# Patient Record
Sex: MALE | Race: AMERICAN INDIAN OR ALASKA NATIVE
[De-identification: names, ages, dates, MRNs, and addresses within clinical notes are randomized per-mention and may not be internally consistent; named-entity substitution may affect disease eponyms.]

---

## 2019-07-25 ENCOUNTER — HOSPITAL ENCOUNTER (INPATIENT)
Dept: HOSPITAL 5 - ED | Age: 54
LOS: 4 days | Discharge: LEFT BEFORE DISCHARGE | DRG: 394 | End: 2019-07-29
Attending: HOSPITALIST | Admitting: INTERNAL MEDICINE
Payer: COMMERCIAL

## 2019-07-25 DIAGNOSIS — I11.0: ICD-10-CM

## 2019-07-25 DIAGNOSIS — Z99.81: ICD-10-CM

## 2019-07-25 DIAGNOSIS — E66.01: ICD-10-CM

## 2019-07-25 DIAGNOSIS — E78.5: ICD-10-CM

## 2019-07-25 DIAGNOSIS — I50.9: ICD-10-CM

## 2019-07-25 DIAGNOSIS — Z90.49: ICD-10-CM

## 2019-07-25 DIAGNOSIS — Z53.21: ICD-10-CM

## 2019-07-25 DIAGNOSIS — K43.6: Primary | ICD-10-CM

## 2019-07-25 LAB
ALBUMIN SERPL-MCNC: 4.4 G/DL (ref 3.9–5)
ALT SERPL-CCNC: 14 UNITS/L (ref 7–56)
APTT BLD: 26.5 SEC. (ref 24.2–36.6)
BASOPHILS # (AUTO): 0.1 K/MM3 (ref 0–0.1)
BASOPHILS NFR BLD AUTO: 0.9 % (ref 0–1.8)
BILIRUB UR QL STRIP: (no result)
BLOOD UR QL VISUAL: (no result)
BUN SERPL-MCNC: 9 MG/DL (ref 9–20)
BUN/CREAT SERPL: 8 %
CALCIUM SERPL-MCNC: 9.5 MG/DL (ref 8.4–10.2)
EOSINOPHIL # BLD AUTO: 0.2 K/MM3 (ref 0–0.4)
EOSINOPHIL NFR BLD AUTO: 1.6 % (ref 0–4.3)
HCT VFR BLD CALC: 41 % (ref 35.5–45.6)
HEMOLYSIS INDEX: 7
HGB BLD-MCNC: 14 GM/DL (ref 11.8–15.2)
INR PPP: 0.99 (ref 0.87–1.13)
LYMPHOCYTES # BLD AUTO: 2.8 K/MM3 (ref 1.2–5.4)
LYMPHOCYTES NFR BLD AUTO: 24.6 % (ref 13.4–35)
MCHC RBC AUTO-ENTMCNC: 34 % (ref 32–34)
MCV RBC AUTO: 85 FL (ref 84–94)
MONOCYTES # (AUTO): 0.8 K/MM3 (ref 0–0.8)
MONOCYTES % (AUTO): 7.2 % (ref 0–7.3)
MUCOUS THREADS #/AREA URNS HPF: (no result) /HPF
PH UR STRIP: 5 [PH] (ref 5–7)
PLATELET # BLD: 417 K/MM3 (ref 140–440)
RBC # BLD AUTO: 4.83 M/MM3 (ref 3.65–5.03)
RBC #/AREA URNS HPF: 3 /HPF (ref 0–6)
UROBILINOGEN UR-MCNC: < 2 MG/DL (ref ?–2)
WBC #/AREA URNS HPF: 1 /HPF (ref 0–6)

## 2019-07-25 PROCEDURE — 96361 HYDRATE IV INFUSION ADD-ON: CPT

## 2019-07-25 PROCEDURE — 81001 URINALYSIS AUTO W/SCOPE: CPT

## 2019-07-25 PROCEDURE — 87116 MYCOBACTERIA CULTURE: CPT

## 2019-07-25 PROCEDURE — 74022 RADEX COMPL AQT ABD SERIES: CPT

## 2019-07-25 PROCEDURE — 80053 COMPREHEN METABOLIC PANEL: CPT

## 2019-07-25 PROCEDURE — 0D9670Z DRAINAGE OF STOMACH WITH DRAINAGE DEVICE, VIA NATURAL OR ARTIFICIAL OPENING: ICD-10-PCS | Performed by: EMERGENCY MEDICINE

## 2019-07-25 PROCEDURE — 84484 ASSAY OF TROPONIN QUANT: CPT

## 2019-07-25 PROCEDURE — 71046 X-RAY EXAM CHEST 2 VIEWS: CPT

## 2019-07-25 PROCEDURE — 83690 ASSAY OF LIPASE: CPT

## 2019-07-25 PROCEDURE — 94660 CPAP INITIATION&MGMT: CPT

## 2019-07-25 PROCEDURE — 82140 ASSAY OF AMMONIA: CPT

## 2019-07-25 PROCEDURE — 85025 COMPLETE CBC W/AUTO DIFF WBC: CPT

## 2019-07-25 PROCEDURE — 93010 ELECTROCARDIOGRAM REPORT: CPT

## 2019-07-25 PROCEDURE — 74177 CT ABD & PELVIS W/CONTRAST: CPT

## 2019-07-25 PROCEDURE — 96376 TX/PRO/DX INJ SAME DRUG ADON: CPT

## 2019-07-25 PROCEDURE — 93005 ELECTROCARDIOGRAM TRACING: CPT

## 2019-07-25 PROCEDURE — 96374 THER/PROPH/DIAG INJ IV PUSH: CPT

## 2019-07-25 PROCEDURE — 85730 THROMBOPLASTIN TIME PARTIAL: CPT

## 2019-07-25 PROCEDURE — 36415 COLL VENOUS BLD VENIPUNCTURE: CPT

## 2019-07-25 PROCEDURE — 96375 TX/PRO/DX INJ NEW DRUG ADDON: CPT

## 2019-07-25 PROCEDURE — 85610 PROTHROMBIN TIME: CPT

## 2019-07-25 RX ADMIN — DEXTROSE AND SODIUM CHLORIDE SCH MLS/HR: 5; .45 INJECTION, SOLUTION INTRAVENOUS at 14:40

## 2019-07-25 RX ADMIN — FAMOTIDINE SCH MG: 10 INJECTION, SOLUTION INTRAVENOUS at 22:33

## 2019-07-25 RX ADMIN — FAMOTIDINE SCH MG: 10 INJECTION, SOLUTION INTRAVENOUS at 14:41

## 2019-07-25 RX ADMIN — Medication SCH ML: at 22:33

## 2019-07-25 RX ADMIN — MORPHINE SULFATE PRN MG: 2 INJECTION, SOLUTION INTRAMUSCULAR; INTRAVENOUS at 15:03

## 2019-07-25 NOTE — HISTORY AND PHYSICAL REPORT
History of Present Illness


Date of examination: 07/25/19


Date of admission: 


07/25/19 09:05





Chief complaint: 





n/v


History of present illness: 





A 54-year-old male with past medical history of hypertension, CHF, obesity, 

ventral hernia, status post cholecystectomy in 2018 and hernia repair in 2017 

who presents to emergency department with chief complaint of nausea, vomiting 

and abdominal pain.  The patient reports that his nausea vomiting started this 

morning.  He also reported subjective fever.  Abdominal pain is diffuse and 8/10

in intensity.  He denies any chest pain or shortness of breath.  No headache or 

visual disturbances.  No hematemesis.





Past History


Past Medical History: heart failure, hypertension, other (obesity)


Past Surgical History: cholecystectomy, hernia repair


Social history: no significant social history


Family history: no significant family history





Medications and Allergies


                                    Allergies











Allergy/AdvReac Type Severity Reaction Status Date / Time


 


No Known Allergies Allergy   Verified 07/25/19 06:55











                                Home Medications











 Medication  Instructions  Recorded  Confirmed  Last Taken  Type


 


Unobtainable  07/25/19 07/25/19 Unknown History














Review of Systems


All systems: negative





Exam





- Constitutional


Vitals: 


                                        











Temp Pulse Resp BP Pulse Ox


 


 97.6 F   76   16   141/91   97 


 


 07/25/19 01:53  07/25/19 08:48  07/25/19 08:48  07/25/19 08:48  07/25/19 08:48











General appearance: Present: no acute distress, obese





- EENT


Eyes: Present: PERRL


ENT: hearing intact, clear oral mucosa





- Neck


Neck: Present: supple, normal ROM





- Respiratory


Respiratory effort: normal


Respiratory: bilateral: CTA





- Cardiovascular


Heart Sounds: Present: S1 & S2.  Absent: rub, click





- Extremities


Extremities: pulses symmetrical, No edema


Peripheral Pulses: within normal limits





- Abdominal


General gastrointestinal: Present: soft, non-tender, non-distended, normal bowel

sounds


Localized gastrointestinal: tender: diffuse (mild)


Male genitourinary: Present: normal





- Integumentary


Integumentary: Present: clear, warm, dry





- Musculoskeletal


Musculoskeletal: gait normal, strength equal bilaterally





- Psychiatric


Psychiatric: appropriate mood/affect, intact judgment & insight





- Neurologic


Neurologic: CNII-XII intact, moves all extremities





Results





- Labs


CBC & Chem 7: 


                                 07/25/19 02:03





                                 07/25/19 02:00


Labs: 


                             Laboratory Last Values











WBC  11.4 K/mm3 (4.5-11.0)  H  07/25/19  02:03    


 


RBC  4.83 M/mm3 (3.65-5.03)   07/25/19  02:03    


 


Hgb  14.0 gm/dl (11.8-15.2)   07/25/19  02:03    


 


Hct  41.0 % (35.5-45.6)   07/25/19  02:03    


 


MCV  85 fl (84-94)   07/25/19  02:03    


 


MCH  29 pg (28-32)   07/25/19  02:03    


 


MCHC  34 % (32-34)   07/25/19  02:03    


 


RDW  14.8 % (13.2-15.2)   07/25/19  02:03    


 


Plt Count  417 K/mm3 (140-440)   07/25/19  02:03    


 


Lymph % (Auto)  24.6 % (13.4-35.0)   07/25/19  02:03    


 


Mono % (Auto)  7.2 % (0.0-7.3)   07/25/19  02:03    


 


Eos % (Auto)  1.6 % (0.0-4.3)   07/25/19  02:03    


 


Baso % (Auto)  0.9 % (0.0-1.8)   07/25/19  02:03    


 


Lymph #  2.8 K/mm3 (1.2-5.4)   07/25/19  02:03    


 


Mono #  0.8 K/mm3 (0.0-0.8)   07/25/19  02:03    


 


Eos #  0.2 K/mm3 (0.0-0.4)   07/25/19  02:03    


 


Baso #  0.1 K/mm3 (0.0-0.1)   07/25/19  02:03    


 


Seg Neutrophils %  65.7 % (40.0-70.0)   07/25/19  02:03    


 


Seg Neutrophils #  7.5 K/mm3 (1.8-7.7)   07/25/19  02:03    


 


PT  12.8 Sec. (12.2-14.9)   07/25/19  03:20    


 


INR  0.99  (0.87-1.13)   07/25/19  03:20    


 


APTT  26.5 Sec. (24.2-36.6)   07/25/19  03:20    


 


Sodium  140 mmol/L (137-145)   07/25/19  02:00    


 


Potassium  3.7 mmol/L (3.6-5.0)   07/25/19  02:00    


 


Chloride  99.3 mmol/L ()   07/25/19  02:00    


 


Carbon Dioxide  29 mmol/L (22-30)   07/25/19  02:00    


 


  15 mmol/L  07/25/19  02:00    


 


BUN  9 mg/dL (9-20)   07/25/19  02:00    


 


  1.1 mg/dL (0.8-1.5)   07/25/19  02:00    


 


Estimated GFR  > 60 ml/min  07/25/19  02:00    


 


  8 %  07/25/19  02:00    


 


Glucose  136 mg/dL ()  H  07/25/19  02:00    


 


Lactic Acid  1.60 mmol/L (0.7-2.0)   07/25/19  03:20    


 


Calcium  9.5 mg/dL (8.4-10.2)   07/25/19  02:00    


 


  0.20 mg/dL (0.1-1.2)   07/25/19  02:00    


 


AST  18 units/L (5-40)   07/25/19  02:00    


 


ALT  14 units/L (7-56)   07/25/19  02:00    


 


  78 units/L ()   07/25/19  02:00    


 


  < 0.010 ng/mL (0.00-0.029)   07/25/19  03:20    


 


  8.4 g/dL (6.3-8.2)  H  07/25/19  02:00    


 


  4.4 g/dL (3.9-5)   07/25/19  02:00    


 


  1.1 %  07/25/19  02:00    


 


  40 units/L (13-60)   07/25/19  03:20    


 


  Yellow  (Yellow)   07/25/19  04:32    


 


  Clear  (Clear)   07/25/19  04:32    


 


  5.0  (5.0-7.0)   07/25/19  04:32    


 


Ur Specific Gravity  1.018  (1.003-1.030)   07/25/19  04:32    


 


  30 mg/dl mg/dL (Negative)   07/25/19  04:32    


 


  Neg mg/dL (Negative)   07/25/19  04:32    


 


  Neg mg/dL (Negative)   07/25/19  04:32    


 


  Mod  (Negative)   07/25/19  04:32    


 


  Neg  (Negative)   07/25/19  04:32    


 


  Neg  (Negative)   07/25/19  04:32    


 


  < 2.0 mg/dL (<2.0)   07/25/19  04:32    


 


Ur Leukocyte Esterase  Neg  (Negative)   07/25/19  04:32    


 


  1.0 /HPF (0.0-6.0)   07/25/19  04:32    


 


  3.0 /HPF (0.0-6.0)   07/25/19  04:32    


 


U Epithel Cells (Auto)  < 1.0 /HPF (0-13.0)   07/25/19  04:32    


 


  Few /HPF  07/25/19  04:32    














Assessment and Plan


Assessment and plan: 





Small bowel obstruction.  CT scan revealed small bowel obstruction with a 

transition point seen near the surgical anastomosis located along the right 

lower quadrant.  Surgery consultation pending.  Conservative management for now 

with NPO status, NG tube to intermittent suction and IV fluid hydration





Ventral hernia.  Per surgery.





Hypertension.  IV hydralazine when necessary while nothing by mouth.





Hyperlipidemia.





Morbid obesity.

## 2019-07-25 NOTE — CAT SCAN REPORT
CT ABDOMEN AND PELVIS WITH CONTRAST



HISTORY: Right mid abdominal pain. Hernia.



COMPARISON: No relevant prior imaging study available.



TECHNIQUE: Axial, coronal and sagittal CT imaging of the abdomen and pelvis was performed  after inje
ction of 100 cc Omnipaque 350 contrast.  All CT scans at this location are performed using CT dose re
duction for ALARA by means of automated exposure control.

 

FINDINGS: 



LOWER CHEST: Mild dependent atelectasis is noted bilaterally. No additional significant abnormality.

LIVER: No significant abnormality.

BILIARY: The gallbladder is absent. No biliary ductal dilatation.

PANCREAS: No significant abnormality.

SPLEEN: No significant abnormality.

ADRENALS: No significant abnormality.

KIDNEYS AND URETERS: Bilateral renal cysts are seen without suspicious lesions. No stones or hydroure
teronephrosis.



GI TRACT: There are multiple ventral hernias containing fat and mildly dilated bowel loops without si
gnificant inflammation. A transition point is seen near the surgical anastomosis located along the ri
ght lower quadrant on images 58-71 of series 301. No significant abnormality of the stomach or colon.
 Unremarkable appendix. 

PERITONEUM: No free fluid. No free air. No fluid collection.

LYMPH NODES: No significant adenopathy.

VASCULATURE: The aorta is normal in caliber with mild generalized atherosclerosis. 



URINARY BLADDER: No significant abnormality.

REPRODUCTIVE ORGANS: No significant abnormality.



ADDITIONAL FINDINGS: None.



SKELETAL SYSTEM: No significant abnormality.



IMPRESSION: 

1. Small bowel obstruction with a transition point as described above.

2. Multiple ventral hernias containing dilated bowel loops.



Signer Name: Tremayne Rashid MD 

Signed: 7/25/2019 7:24 AM

 Workstation Name: [email or web address] [email or web address]

## 2019-07-25 NOTE — PROGRESS NOTE
Assessment and Plan





Full consult dictated:





Morbidly obese male.   sbo.  multiple abd surgeries with multiple ventral / 

incisional hernias.   no evidence of incarceration;  clinically or on CT





imp





morbid obesity


multitude of non incarcerated ventral hernias


sbo





rec


NPO


NG suction





f/u abd series in am.





may need bariatric surg eval in sbo does not resolve





will follow





History of present illness: 





A 54-year-old male with past medical history of hypertension, CHF, obesity, 

ventral hernia, status post cholecystectomy in 2018 and hernia repair in 2017 

who presents to emergency department with chief complaint of nausea, vomiting 

and abdominal pain.  The patient reports that his nausea vomiting started this 

morning.  He also reported subjective fever.  Abdominal pain is diffuse and 8/10

in intensity.  He denies any chest pain or shortness of breath.  No headache or 

visual disturbances.  No hematemesis.





 Past History 


Past Medical History: heart failure, hypertension, other (obesity)


Past Surgical History: cholecystectomy, hernia repair


Social history: no significant social history


Family history: no significant family history








                                Selected Entries











  07/25/19 07/25/19





  01:53 08:48


 


Temperature 97.6 F 


 


Pulse Rate  76


 


Respiratory  16





Rate  


 


Blood Pressure  141/91





[Left]  








                                Laboratory Tests











  07/25/19 07/25/19





  02:00 02:03


 


WBC   11.4 H


 


Hgb   14.0


 


Hct   41.0


 


Potassium  3.7 














Objective


                               Vital Signs - 12hr











  07/25/19 07/25/19





  01:53 08:48


 


Temperature 97.6 F 


 


Pulse Rate 76 76


 


Respiratory 19 16





Rate  


 


Blood Pressure 193/104 


 


Blood Pressure  141/91





[Left]  


 


O2 Sat by Pulse 96 97





Oximetry  














- Labs





                                 07/25/19 02:03





                                 07/25/19 02:00


                                 Diabetes panel











  07/25/19 Range/Units





  02:00 


 


Sodium  140  (137-145)  mmol/L


 


Potassium  3.7  (3.6-5.0)  mmol/L


 


Chloride  99.3  ()  mmol/L


 


Carbon Dioxide  29  (22-30)  mmol/L


 


BUN  9  (9-20)  mg/dL


 


Creatinine  1.1  (0.8-1.5)  mg/dL


 


Glucose  136 H  ()  mg/dL


 


Calcium  9.5  (8.4-10.2)  mg/dL


 


AST  18  (5-40)  units/L


 


ALT  14  (7-56)  units/L


 


Alkaline Phosphatase  78  ()  units/L


 


Total Protein  8.4 H  (6.3-8.2)  g/dL


 


Albumin  4.4  (3.9-5)  g/dL








                                  Calcium panel











  07/25/19 Range/Units





  02:00 


 


Calcium  9.5  (8.4-10.2)  mg/dL


 


Albumin  4.4  (3.9-5)  g/dL








                                 Pituitary panel











  07/25/19 Range/Units





  02:00 


 


Sodium  140  (137-145)  mmol/L


 


Potassium  3.7  (3.6-5.0)  mmol/L


 


Chloride  99.3  ()  mmol/L


 


Carbon Dioxide  29  (22-30)  mmol/L


 


BUN  9  (9-20)  mg/dL


 


Creatinine  1.1  (0.8-1.5)  mg/dL


 


Glucose  136 H  ()  mg/dL


 


Calcium  9.5  (8.4-10.2)  mg/dL








                                  Adrenal panel











  07/25/19 Range/Units





  02:00 


 


Sodium  140  (137-145)  mmol/L


 


Potassium  3.7  (3.6-5.0)  mmol/L


 


Chloride  99.3  ()  mmol/L


 


Carbon Dioxide  29  (22-30)  mmol/L


 


BUN  9  (9-20)  mg/dL


 


Creatinine  1.1  (0.8-1.5)  mg/dL


 


Glucose  136 H  ()  mg/dL


 


Calcium  9.5  (8.4-10.2)  mg/dL


 


Total Bilirubin  0.20  (0.1-1.2)  mg/dL


 


AST  18  (5-40)  units/L


 


ALT  14  (7-56)  units/L


 


Alkaline Phosphatase  78  ()  units/L


 


Total Protein  8.4 H  (6.3-8.2)  g/dL


 


Albumin  4.4  (3.9-5)  g/dL

## 2019-07-25 NOTE — EMERGENCY DEPARTMENT REPORT
<YOSI GONZALEZ - Last Filed: 19 05:58>





ED Abdominal Pain HPI





- General


Chief Complaint: Abdominal Pain


Stated Complaint: ABDOMINAL PAIN


Time Seen by Provider: 19 03:08


Source: patient


Mode of arrival: Ambulatory


Limitations: No Limitations





- History of Present Illness


Initial Comments: 


pt is a 55 y/o aam with hx of htn, chf, Obesity, Ventral Hernia,  s/p 

cholecystectomy 2018,  last hernia repair , who presents for abd pain and 

n/v since this am,  subjective fever, pain is 8/10 , last po intake this am, pt 

states he usually goes to [facility] for healthcare , but pain too bad to wait 

tonight.   





MD Complaint: abdominal pain


Onset/Timin


-: days(s)


Location: periumbilical


Radiation: LLQ, RLQ


Migration to: periumbilical


Severity: moderate


Severity scale (0 -10): 7


Quality: aching, sharp


Consistency: constant


Improves With: nothing


Worsens With: eating


Associated Symptoms: nausea, vomiting, fever, constipation





- Related Data


                                    Allergies











Allergy/AdvReac Type Severity Reaction Status Date / Time


 


No Known Allergies Allergy   Verified 19 06:55














ED Review of Systems


Constitutional: fever.  denies: chills


Eyes: denies: eye pain, eye discharge, vision change


ENT: denies: ear pain, throat pain


Respiratory: denies: cough, shortness of breath, wheezing


Cardiovascular: edema (bila le ).  denies: chest pain, palpitations


Endocrine: no symptoms reported


Gastrointestinal: abdominal pain, nausea, constipation


Genitourinary: denies: urgency, dysuria


Musculoskeletal: denies: back pain, joint swelling, arthralgia


Skin: denies: rash, lesions


Neurological: denies: headache, weakness, paresthesias


Psychiatric: denies: anxiety, depression


Hematological/Lymphatic: as per HPI





ED Past Medical Hx





- Past Medical History


Previous Medical History?: Yes


Hx Hypertension: Yes


Hx Congestive Heart Failure: Yes





- Surgical History


Past Surgical History?: Yes


Hx Cholecystectomy: Yes


Additional Surgical History: hernia sx





- Social History


Smoking Status: Never Smoker





ED Physical Exam





- General


Limitations: No Limitations


General appearance: alert, in no apparent distress





- Head


Head exam: Present: atraumatic, normocephalic





- Eye


Eye exam: Present: normal appearance, PERRL, EOMI


Pupils: Present: normal accommodation





- ENT


ENT exam: Present: normal orophraynx, mucous membranes moist, TM's normal 

bilaterally, normal external ear exam





- Neck


Neck exam: Present: normal inspection, full ROM.  Absent: tenderness, 

meningismus, lymphadenopathy, thyromegaly





- Expanded Neck Exam


  ** Expanded


Neck exam: Absent: tenderness, midline deformity, anterior neck swelling, 

thyroid mass, carotid bruit, tracheal deviation





- Respiratory


Respiratory exam: Present: normal lung sounds bilaterally.  Absent: respiratory 

distress, wheezes, stridor, chest wall tenderness, prolonged expiratory





- Cardiovascular


Cardiovascular Exam: Present: regular rate, normal rhythm, normal heart sounds





- GI/Abdominal


GI/Abdominal exam: Present: soft, distended, tenderness, guarding, rebound, 

diminished bowel sounds, hernia.  Absent: rigid, bruit





- Rectal


Rectal exam: Present: deferred





- Extremities Exam


Extremities exam: Present: normal inspection, full ROM, normal capillary refill,

pedal edema (mild bilat LE Edema nonpitting).  Absent: tenderness, joint swel

ling, calf tenderness





- Back Exam


Back exam: Present: normal inspection, full ROM.  Absent: tenderness, CVA 

tenderness (R), CVA tenderness (L), muscle spasm, paraspinal tenderness, 

vertebral tenderness, rash noted





- Neurological Exam


Neurological exam: Present: alert, oriented X3, CN II-XII intact, normal gait, 

reflexes normal.  Absent: motor sensory deficit





- Psychiatric


Psychiatric exam: Present: normal affect, normal mood





- Skin


Skin exam: Present: warm, dry, intact, normal color.  Absent: rash





ED Medical Decision Making





- Lab Data


Result diagrams: 


                                 19 02:03





                                 19 02:00





- Radiology Data


Radiology results: report reviewed, image reviewed








Ordering Physician: YOSI GONZALEZ NP 


Date of Service: 19 


Procedure(s): XR chest routine 2V 


Accession Number(s): [id number] 





cc: YOSI GONZALEZ NP 





Fluoro Time In Minutes: 





CHEST 2 VIEWS 





INDICATION / CLINICAL INFORMATION: 


Shortness of breath for 3 days. 





COMPARISON: 


None available. 





FINDINGS: 





SUPPORT DEVICES: None. 


HEART / MEDIASTINUM: No significant abnormality. 


LUNGS / PLEURA: There is mild bibasilar atelectasis. The lungs are otherwise 

clear. No significant 


pleural effusion. No pneumothorax. 





ADDITIONAL FINDINGS: No significant additional findings. 





IMPRESSION: 


Bibasilar atelectasis. 





Signer Name: Tremayne Rashid MD 


Signed: 2019 4:02 AM 


Workstation Name: [id number] 








Transcribed By: MN 


Dictated By: Tremayne Rashid MD 


Electronically Authenticated By: Tremayne Rashid MD 


Signed Date/Time: 19 











DD/DT: 19 


TD/TT:








ED Disposition


Clinical Impression: 


 SBO (small bowel obstruction), Ventral hernia





Disposition:  OP ADMIT IP TO THIS HOSP


Condition: Stable


Referrals: 


[facility], [place] [Primary Care Provider] - 3-5 Days





<JESSICA MARTINEZ - Last Filed: 19 08:47>





ED Course





- Reevaluation(s)


Reevaluation #1: 





19 07:00 sign out from Yosi Gonzalez NP to Jessica Martinez PA-C pending CT

abd/pelvis


7:30 AM: CT ab/pelvis shows SBO with multiple ventral hernias with dilated loops

of bowel present in the hernia, pt made NPO, NGT ordered, case discussed with 

Dr. Pappas, Dr. Pappas took over pt and pt moved to the Main ED    








ED Medical Decision Making





- Lab Data


Result diagrams: 


                                 19 02:03





                                 19 02:00





<NORI PAPPAS - Last Filed: 19 09:23>





ED Review of Systems


ROS: 


Stated complaint: ABDOMINAL PAIN


Other details as noted in HPI








ED Course


                                   Vital Signs











  19





  01:53 08:48


 


Temperature 97.6 F 


 


Pulse Rate 76 76


 


Respiratory 19 16





Rate  


 


Blood Pressure 193/104 


 


Blood Pressure  141/91





[Left]  


 


O2 Sat by Pulse 96 97





Oximetry  














- Reevaluation(s)


Reevaluation #2: 





19 09:23


Patient received Dilaudid 1 mg since the NG tube.  Patient placed in supine 

position I attempted to reduce right sided ventral hernia lateral to the midline

 incisional scar.  I was able to partially reduce but unable to completely 

reduce this hernia.





- Consultations


Consultation #1: 





19 08:09


case d/w Dr Silva, will consult





ED Medical Decision Making





- Lab Data


Result diagrams: 


                                 19 02:03





                                 19 02:00





- Medical Decision Making





additional pain meds ordered


ngt


surgery consult


hospitalist informed





Critical Care Time: No


Critical care attestation.: 


If time is entered above; I have spent that time in minutes in the direct care 

of this critically ill patient, excluding procedure time.








ED Disposition


Is pt being admited?: Yes


Time of Disposition: 08:09 (Dr wolff/hosp)

## 2019-07-25 NOTE — XRAY REPORT
ABDOMINAL SERIES WITH PA CHEST

7/25/2019



INDICATION / CLINICAL INFORMATION:

sbo.



COMPARISON:

7/25/2019 abdominal/pelvic CT scan



FINDINGS:

The nasogastric tube is called mid stomach.

There is persistent gaseous distention of several loops of small bowel localized to the upper abdomen
.

The colon remains nondistended.

Urinary bladder is filled with contrast from recent abdominal CT scan.



The accompanying chest x-ray shows mild atelectatic changes at both lung bases but no areas of airspa
ce consolidation.





IMPRESSION:

No significant change in gaseous distention of small bowel.



Signer Name: Horace Cartagena MD 

Signed: 7/25/2019 2:31 PM

 Workstation Name: [id number]

## 2019-07-25 NOTE — XRAY REPORT
CHEST 2 VIEWS 



INDICATION / CLINICAL INFORMATION:

Shortness of breath for 3 days.



COMPARISON: 

None available.



FINDINGS:



SUPPORT DEVICES: None.

HEART / MEDIASTINUM: No significant abnormality. 

LUNGS / PLEURA: There is mild bibasilar atelectasis. The lungs are otherwise clear. No significant pl
eural effusion. No pneumothorax. 



ADDITIONAL FINDINGS: No significant additional findings.



IMPRESSION:

Bibasilar atelectasis.



Signer Name: Tremayne Rashid MD 

Signed: 7/25/2019 4:02 AM

 Workstation Name: [id number]

## 2019-07-26 LAB
ALBUMIN SERPL-MCNC: 4 G/DL (ref 3.9–5)
ALT SERPL-CCNC: 19 UNITS/L (ref 7–56)
BASOPHILS # (AUTO): 0 K/MM3 (ref 0–0.1)
BASOPHILS NFR BLD AUTO: 0.4 % (ref 0–1.8)
BUN SERPL-MCNC: 11 MG/DL (ref 9–20)
BUN/CREAT SERPL: 11 %
CALCIUM SERPL-MCNC: 9.1 MG/DL (ref 8.4–10.2)
EOSINOPHIL # BLD AUTO: 0.2 K/MM3 (ref 0–0.4)
EOSINOPHIL NFR BLD AUTO: 2.5 % (ref 0–4.3)
HCT VFR BLD CALC: 40.6 % (ref 35.5–45.6)
HEMOLYSIS INDEX: 7
HGB BLD-MCNC: 13.5 GM/DL (ref 11.8–15.2)
LYMPHOCYTES # BLD AUTO: 3 K/MM3 (ref 1.2–5.4)
LYMPHOCYTES NFR BLD AUTO: 30.4 % (ref 13.4–35)
MCHC RBC AUTO-ENTMCNC: 33 % (ref 32–34)
MCV RBC AUTO: 86 FL (ref 84–94)
MONOCYTES # (AUTO): 0.9 K/MM3 (ref 0–0.8)
MONOCYTES % (AUTO): 9 % (ref 0–7.3)
PLATELET # BLD: 354 K/MM3 (ref 140–440)
RBC # BLD AUTO: 4.74 M/MM3 (ref 3.65–5.03)

## 2019-07-26 RX ADMIN — DEXTROSE AND SODIUM CHLORIDE SCH MLS/HR: 5; .45 INJECTION, SOLUTION INTRAVENOUS at 11:25

## 2019-07-26 RX ADMIN — MORPHINE SULFATE PRN MG: 2 INJECTION, SOLUTION INTRAMUSCULAR; INTRAVENOUS at 12:25

## 2019-07-26 RX ADMIN — DEXTROSE AND SODIUM CHLORIDE SCH MLS/HR: 5; .45 INJECTION, SOLUTION INTRAVENOUS at 23:35

## 2019-07-26 RX ADMIN — ACETAMINOPHEN PRN MG: 325 TABLET ORAL at 21:07

## 2019-07-26 RX ADMIN — Medication SCH ML: at 11:25

## 2019-07-26 RX ADMIN — MINERAL OIL SCH ML: 1000 SOLUTION ORAL at 15:49

## 2019-07-26 RX ADMIN — FAMOTIDINE SCH MG: 10 INJECTION, SOLUTION INTRAVENOUS at 22:59

## 2019-07-26 RX ADMIN — ACETAMINOPHEN PRN MG: 325 TABLET ORAL at 15:49

## 2019-07-26 RX ADMIN — FAMOTIDINE SCH MG: 10 INJECTION, SOLUTION INTRAVENOUS at 11:25

## 2019-07-26 RX ADMIN — MORPHINE SULFATE PRN MG: 2 INJECTION, SOLUTION INTRAMUSCULAR; INTRAVENOUS at 18:47

## 2019-07-26 RX ADMIN — MINERAL OIL SCH ML: 1000 SOLUTION ORAL at 22:52

## 2019-07-26 RX ADMIN — MINERAL OIL SCH ML: 1000 SOLUTION ORAL at 18:47

## 2019-07-26 RX ADMIN — Medication SCH ML: at 23:00

## 2019-07-26 NOTE — XRAY REPORT
ABDOMINAL SERIES WITH PA CHEST X-RAY

7/26/2019



INDICATION / CLINICAL INFORMATION:

sbo.



COMPARISON:

7/25/2019



FINDINGS:

Small bowel gaseous distention is slightly improved.

The colon gas pattern remains normal.



No evidence of peritoneum.

The accompanying chest x-ray shows no acute pulmonary disease.



Signer Name: Horace Cartagena MD 

Signed: 7/26/2019 9:33 AM

 Workstation Name: [id number]

## 2019-07-26 NOTE — PROGRESS NOTE
Assessment and Plan


Assessment and plan: 





Small bowel obstruction.  CT scan revealed small bowel obstruction with a 

transition point seen near the surgical anastomosis located along the right 

lower quadrant.  Surgery following.  Conservative management for now with NPO 

status, NG tube to intermittent suction and IV fluid hydration.  Patient bety liu pulled out NG tube and refused reinsertion.  Surgery has now convinced 

the patient to have it reinserted.





Ventral hernia.  Per surgery.





Hypertension.  IV hydralazine when necessary while nothing by mouth.





Hyperlipidemia.





Morbid obesity.





Probable OHS/KATELIN.  Continue O2





History


Interval history: 


Pulled out ng and had refused re-insertion.  states "feeling better"








Hospitalist Physical





- Constitutional


Vitals: 


                                        











Temp Pulse Resp BP Pulse Ox


 


 98.0 F   79   20   148/91   94 


 


 07/26/19 05:01  07/26/19 05:01  07/26/19 05:01  07/26/19 05:01  07/26/19 05:01











General appearance: Present: no acute distress, obese





- EENT


Eyes: Present: PERRL, EOM intact


ENT: hearing intact, clear oral mucosa, dentition normal





- Neck


Neck: Present: supple, normal ROM





- Respiratory


Respiratory effort: normal


Respiratory: bilateral: CTA





- Cardiovascular


Rhythm: regular


Heart Sounds: Present: S1 & S2.  Absent: gallop, rub





- Extremities


Extremities: no ischemia, No edema, Full ROM





- Abdominal


General gastrointestinal: soft, non-tender, non-distended, normal bowel sounds





- Integumentary


Integumentary: Present: clear, warm, dry





- Neurologic


Neurologic: CNII-XII intact, moves all extremities





Results





- Labs


CBC & Chem 7: 


                                 07/26/19 07:23





                                 07/26/19 07:23


Labs: 


                             Laboratory Last Values











WBC  9.8 K/mm3 (4.5-11.0)   07/26/19  07:23    


 


RBC  4.74 M/mm3 (3.65-5.03)   07/26/19  07:23    


 


Hgb  13.5 gm/dl (11.8-15.2)   07/26/19  07:23    


 


Hct  40.6 % (35.5-45.6)   07/26/19  07:23    


 


MCV  86 fl (84-94)   07/26/19  07:23    


 


MCH  29 pg (28-32)   07/26/19  07:23    


 


MCHC  33 % (32-34)   07/26/19  07:23    


 


RDW  14.8 % (13.2-15.2)   07/26/19  07:23    


 


Plt Count  354 K/mm3 (140-440)   07/26/19  07:23    


 


Lymph % (Auto)  30.4 % (13.4-35.0)   07/26/19  07:23    


 


Mono % (Auto)  9.0 % (0.0-7.3)  H  07/26/19  07:23    


 


Eos % (Auto)  2.5 % (0.0-4.3)   07/26/19  07:23    


 


Baso % (Auto)  0.4 % (0.0-1.8)   07/26/19  07:23    


 


Lymph #  3.0 K/mm3 (1.2-5.4)   07/26/19  07:23    


 


Mono #  0.9 K/mm3 (0.0-0.8)  H  07/26/19  07:23    


 


Eos #  0.2 K/mm3 (0.0-0.4)   07/26/19  07:23    


 


Baso #  0.0 K/mm3 (0.0-0.1)   07/26/19  07:23    


 


Seg Neutrophils %  57.7 % (40.0-70.0)   07/26/19  07:23    


 


Seg Neutrophils #  5.7 K/mm3 (1.8-7.7)   07/26/19  07:23    


 


PT  12.8 Sec. (12.2-14.9)   07/25/19  03:20    


 


INR  0.99  (0.87-1.13)   07/25/19  03:20    


 


APTT  26.5 Sec. (24.2-36.6)   07/25/19  03:20    


 


Sodium  143 mmol/L (137-145)   07/26/19  07:23    


 


Potassium  3.7 mmol/L (3.6-5.0)   07/26/19  07:23    


 


Chloride  103.3 mmol/L ()   07/26/19  07:23    


 


Carbon Dioxide  30 mmol/L (22-30)   07/26/19  07:23    


 


  13 mmol/L  07/26/19  07:23    


 


BUN  11 mg/dL (9-20)   07/26/19  07:23    


 


  1.0 mg/dL (0.8-1.5)   07/26/19  07:23    


 


Estimated GFR  > 60 ml/min  07/26/19  07:23    


 


  11 %  07/26/19  07:23    


 


Glucose  106 mg/dL ()  H  07/26/19  07:23    


 


Lactic Acid  1.60 mmol/L (0.7-2.0)   07/25/19  03:20    


 


Calcium  9.1 mg/dL (8.4-10.2)   07/26/19  07:23    


 


  0.60 mg/dL (0.1-1.2)   07/26/19  07:23    


 


AST  15 units/L (5-40)   07/26/19  07:23    


 


ALT  19 units/L (7-56)   07/26/19  07:23    


 


  74 units/L ()   07/26/19  07:23    


 


  < 0.010 ng/mL (0.00-0.029)   07/25/19  03:20    


 


  7.8 g/dL (6.3-8.2)   07/26/19  07:23    


 


  4.0 g/dL (3.9-5)   07/26/19  07:23    


 


  1.1 %  07/26/19  07:23    


 


  40 units/L (13-60)   07/25/19  03:20    


 


  Yellow  (Yellow)   07/25/19  04:32    


 


  Clear  (Clear)   07/25/19  04:32    


 


  5.0  (5.0-7.0)   07/25/19  04:32    


 


Ur Specific Gravity  1.018  (1.003-1.030)   07/25/19  04:32    


 


  30 mg/dl mg/dL (Negative)   07/25/19  04:32    


 


  Neg mg/dL (Negative)   07/25/19  04:32    


 


  Neg mg/dL (Negative)   07/25/19  04:32    


 


  Mod  (Negative)   07/25/19  04:32    


 


  Neg  (Negative)   07/25/19  04:32    


 


  Neg  (Negative)   07/25/19  04:32    


 


  < 2.0 mg/dL (<2.0)   07/25/19  04:32    


 


Ur Leukocyte Esterase  Neg  (Negative)   07/25/19  04:32    


 


  1.0 /HPF (0.0-6.0)   07/25/19  04:32    


 


  3.0 /HPF (0.0-6.0)   07/25/19  04:32    


 


U Epithel Cells (Auto)  < 1.0 /HPF (0-13.0)   07/25/19  04:32    


 


  Few /HPF  07/25/19  04:32    














Active Medications





- Current Medications


Current Medications: 














Generic Name Dose Route Start Last Admin





  Trade Name Freq  PRN Reason Stop Dose Admin


 


Acetaminophen  650 mg  07/25/19 10:07 





  Tylenol  PO  





  Q4H PRN  





  Pain MILD(1-3)/Fever >100.5/HA  


 


Famotidine  20 mg  07/25/19 13:00  07/25/19 22:33





  Pepcid  IV   20 mg





  BID LACEY   Administration


 


Hydralazine HCl  5 mg  07/25/19 23:49  07/26/19 00:02





  Apresoline  IV   5 mg





  Q6H PRN   Administration





  Hypertension  


 


Dextrose/Sodium Chloride  1,000 mls @ 75 mls/hr  07/25/19 11:00  07/25/19 14:40





  D5/0.45ns  IV   75 mls/hr





  AS DIRECT LACEY   Administration


 


Morphine Sulfate  2 mg  07/25/19 14:46  07/25/19 15:03





  Morphine  IV   2 mg





  Q6H PRN   Administration





  Pain, Moderate (4-6)  


 


Ondansetron HCl  4 mg  07/25/19 10:07 





  Zofran  IV  





  Q8H PRN  





  Nausea And Vomiting  


 


Sodium Chloride  10 ml  07/25/19 22:00  07/25/19 22:33





  Sodium Chloride Flush Syringe 10 Ml  IV   10 ml





  BID LACEY   Administration


 


Sodium Chloride  10 ml  07/25/19 10:07 





  Sodium Chloride Flush Syringe 10 Ml  IV  





  PRN PRN  





  LINE FLUSH

## 2019-07-26 NOTE — CONSULTATION
REASON FOR CONSULTATION:  Rule out small bowel obstruction.



HISTORY OF PRESENT ILLNESS:  The patient is a 54-year-old morbidly obese male

who presented to the Emergency Room with recent onset of abdominal pain.  Denied

any nausea or vomiting.  States his last bowel movement was yesterday.



PAST MEDICAL HISTORY:  Pertinent for morbid obesity as well as hypertension and

sleep apnea.



PAST SURGICAL HISTORY:  The patient has had a multitude of abdominal surgeries,

all done at [facility].  Apparently, the patient had a cholecystectomy, also

umbilical hernia repair.  Subsequent incisional hernia repair with some sort of

bowel resection ?



ALLERGIES:  No known allergies.



MEDICATIONS:  "



FAMILY HISTORY:  Negative.



SOCIAL HISTORY:  Quit smoking and drinking approximately 15 years ago.



PHYSICAL EXAMINATION:

GENERAL:  At this time revealed the patient to be morbidly obese.  NG tube is in

place and appears to be suctioning well.

VITAL SIGNS:  Show him to be afebrile with a temperature of 97.6, blood pressure

is 141/91, pulse of 76, respirations 16.

ABDOMEN:  Examination of the abdomen once again reveals it to be morbidly obese.

 There are multiple scars noted.  Also, multiple ventral/incisional hernias are

seen.  All of them appear to be reducible.  No abdominal pain is noted at this

time.



LABORATORY DATA:  Lab work at present includes a CBC, which shows a white count

of 11.4, H and H is 14 and 41.  Electrolytes are essentially within normal

limits, including a sodium of 140, potassium 3.7, chloride of 99, BUN is 9,

creatinine is 1.1.  LFTs are also within normal limits.  Glucose is 136.  CT

scan of the abdomen without any p.o. contrast was done, which I have reviewed

with the radiologist.  The findings are consistent with a small bowel

obstruction, but the areas of herniation did not appear to be incarcerated, as

dilated bowel loops were noted to come in and out of the hernia regions. 

Complex hernias are noted.



IMPRESSION:  At this time is that of a 54-year-old hypertensive, morbidly obese

male, rule out small-bowel obstruction.



RECOMMENDATIONS:  At this time is to keep the patient n.p.o. on IV fluid

hydration and NG suction.  I will repeat abdominal series in the morning.  Also,

contemplate obtaining a bariatric surgeon evaluation if the small-bowel

obstruction does not appear to be resolving, and thus, surgical intervention may

need to be contemplated.  I will follow with you.



Thank you very much for consultation.





DD: 07/25/2019 12:35

DT: 07/26/2019 00:34

JOB# [id number]  [id number]

EDDY/ANA PAULA

## 2019-07-27 RX ADMIN — MINERAL OIL SCH ML: 1000 SOLUTION ORAL at 10:08

## 2019-07-27 RX ADMIN — HYDRALAZINE HYDROCHLORIDE PRN MG: 20 INJECTION INTRAMUSCULAR; INTRAVENOUS at 23:42

## 2019-07-27 RX ADMIN — MINERAL OIL SCH: 1000 SOLUTION ORAL at 05:55

## 2019-07-27 RX ADMIN — MINERAL OIL SCH ML: 1000 SOLUTION ORAL at 17:17

## 2019-07-27 RX ADMIN — MINERAL OIL SCH: 1000 SOLUTION ORAL at 02:38

## 2019-07-27 RX ADMIN — Medication SCH ML: at 10:08

## 2019-07-27 RX ADMIN — FAMOTIDINE SCH MG: 10 INJECTION, SOLUTION INTRAVENOUS at 10:08

## 2019-07-27 RX ADMIN — MINERAL OIL SCH ML: 1000 SOLUTION ORAL at 13:11

## 2019-07-27 RX ADMIN — Medication SCH ML: at 21:35

## 2019-07-27 RX ADMIN — MINERAL OIL SCH ML: 1000 SOLUTION ORAL at 21:35

## 2019-07-27 RX ADMIN — DEXTROSE AND SODIUM CHLORIDE SCH MLS/HR: 5; .45 INJECTION, SOLUTION INTRAVENOUS at 15:20

## 2019-07-27 RX ADMIN — FAMOTIDINE SCH MG: 10 INJECTION, SOLUTION INTRAVENOUS at 21:34

## 2019-07-27 NOTE — PROGRESS NOTE
Assessment and Plan


Assessment and plan: 





Small bowel obstruction.  Surgery following.  Conservative management for now 

with NPO status, NG tube to intermittent suction and IV fluid hydration.  NG 

tube came out again yesterday.  Await surgical recommendations regarding diet 

versus reinsertion of NG tube.  KUB ordered





Ventral hernia.  Per surgery.





Hypertension.  IV hydralazine when necessary while nothing by mouth.





Hyperlipidemia.





Morbid obesity.





Probable OHS/KATELIN.  Continue O2





History


Interval history: 


Patient states that N G-tube "fell out".  He denies pulling it out.  He wants to

go home.








Hospitalist Physical





- Constitutional


Vitals: 


                                        











Temp Pulse Resp BP Pulse Ox


 


 97.9 F   85   18   138/70   95 


 


 07/27/19 06:10  07/27/19 06:45  07/27/19 06:45  07/27/19 06:42  07/27/19 06:45











General appearance: Present: no acute distress, obese





- EENT


Eyes: Present: PERRL, EOM intact


ENT: hearing intact, clear oral mucosa, dentition normal





- Neck


Neck: Present: supple, normal ROM





- Respiratory


Respiratory effort: normal


Respiratory: bilateral: CTA





- Cardiovascular


Rhythm: regular


Heart Sounds: Present: S1 & S2.  Absent: gallop, rub





- Extremities


Extremities: no ischemia, No edema, Full ROM





- Abdominal


General gastrointestinal: soft, non-tender, non-distended, normal bowel sounds





- Integumentary


Integumentary: Present: clear, warm, dry





- Neurologic


Neurologic: CNII-XII intact, moves all extremities





Results





- Labs


CBC & Chem 7: 


                                 07/26/19 07:23





                                 07/26/19 07:23


Labs: 


                             Laboratory Last Values











WBC  9.8 K/mm3 (4.5-11.0)   07/26/19  07:23    


 


RBC  4.74 M/mm3 (3.65-5.03)   07/26/19  07:23    


 


Hgb  13.5 gm/dl (11.8-15.2)   07/26/19  07:23    


 


Hct  40.6 % (35.5-45.6)   07/26/19  07:23    


 


MCV  86 fl (84-94)   07/26/19  07:23    


 


MCH  29 pg (28-32)   07/26/19  07:23    


 


MCHC  33 % (32-34)   07/26/19  07:23    


 


RDW  14.8 % (13.2-15.2)   07/26/19  07:23    


 


Plt Count  354 K/mm3 (140-440)   07/26/19  07:23    


 


Lymph % (Auto)  30.4 % (13.4-35.0)   07/26/19  07:23    


 


Mono % (Auto)  9.0 % (0.0-7.3)  H  07/26/19  07:23    


 


Eos % (Auto)  2.5 % (0.0-4.3)   07/26/19  07:23    


 


Baso % (Auto)  0.4 % (0.0-1.8)   07/26/19  07:23    


 


Lymph #  3.0 K/mm3 (1.2-5.4)   07/26/19  07:23    


 


Mono #  0.9 K/mm3 (0.0-0.8)  H  07/26/19  07:23    


 


Eos #  0.2 K/mm3 (0.0-0.4)   07/26/19  07:23    


 


Baso #  0.0 K/mm3 (0.0-0.1)   07/26/19  07:23    


 


Seg Neutrophils %  57.7 % (40.0-70.0)   07/26/19  07:23    


 


Seg Neutrophils #  5.7 K/mm3 (1.8-7.7)   07/26/19  07:23    


 


PT  12.8 Sec. (12.2-14.9)   07/25/19  03:20    


 


INR  0.99  (0.87-1.13)   07/25/19  03:20    


 


APTT  26.5 Sec. (24.2-36.6)   07/25/19  03:20    


 


Sodium  143 mmol/L (137-145)   07/26/19  07:23    


 


Potassium  3.7 mmol/L (3.6-5.0)   07/26/19  07:23    


 


Chloride  103.3 mmol/L ()   07/26/19  07:23    


 


Carbon Dioxide  30 mmol/L (22-30)   07/26/19  07:23    


 


  13 mmol/L  07/26/19  07:23    


 


BUN  11 mg/dL (9-20)   07/26/19  07:23    


 


  1.0 mg/dL (0.8-1.5)   07/26/19  07:23    


 


Estimated GFR  > 60 ml/min  07/26/19  07:23    


 


  11 %  07/26/19  07:23    


 


Glucose  106 mg/dL ()  H  07/26/19  07:23    


 


Lactic Acid  1.60 mmol/L (0.7-2.0)   07/25/19  03:20    


 


Calcium  9.1 mg/dL (8.4-10.2)   07/26/19  07:23    


 


  0.60 mg/dL (0.1-1.2)   07/26/19  07:23    


 


AST  15 units/L (5-40)   07/26/19  07:23    


 


ALT  19 units/L (7-56)   07/26/19  07:23    


 


  74 units/L ()   07/26/19  07:23    


 


  < 0.010 ng/mL (0.00-0.029)   07/25/19  03:20    


 


  7.8 g/dL (6.3-8.2)   07/26/19  07:23    


 


  4.0 g/dL (3.9-5)   07/26/19  07:23    


 


  1.1 %  07/26/19  07:23    


 


  40 units/L (13-60)   07/25/19  03:20    


 


  Yellow  (Yellow)   07/25/19  04:32    


 


  Clear  (Clear)   07/25/19  04:32    


 


  5.0  (5.0-7.0)   07/25/19  04:32    


 


Ur Specific Gravity  1.018  (1.003-1.030)   07/25/19  04:32    


 


  30 mg/dl mg/dL (Negative)   07/25/19  04:32    


 


  Neg mg/dL (Negative)   07/25/19  04:32    


 


  Neg mg/dL (Negative)   07/25/19  04:32    


 


  Mod  (Negative)   07/25/19  04:32    


 


  Neg  (Negative)   07/25/19  04:32    


 


  Neg  (Negative)   07/25/19  04:32    


 


  < 2.0 mg/dL (<2.0)   07/25/19  04:32    


 


Ur Leukocyte Esterase  Neg  (Negative)   07/25/19  04:32    


 


  1.0 /HPF (0.0-6.0)   07/25/19  04:32    


 


  3.0 /HPF (0.0-6.0)   07/25/19  04:32    


 


U Epithel Cells (Auto)  < 1.0 /HPF (0-13.0)   07/25/19  04:32    


 


  Few /HPF  07/25/19  04:32    














Active Medications





- Current Medications


Current Medications: 














Generic Name Dose Route Start Last Admin





  Trade Name Freq  PRN Reason Stop Dose Admin


 


Acetaminophen  650 mg  07/25/19 10:07  07/26/19 21:07





  Tylenol  PO   650 mg





  Q4H PRN   Administration





  Pain MILD(1-3)/Fever >100.5/HA  


 


Famotidine  20 mg  07/25/19 13:00  07/27/19 10:08





  Pepcid  IV   20 mg





  BID LACEY   Administration


 


Hydralazine HCl  10 mg  07/26/19 11:11 





  Apresoline  IV  





  Q6H PRN  





  HTN SBP>150  


 


Dextrose/Sodium Chloride  1,000 mls @ 75 mls/hr  07/25/19 11:00  07/26/19 23:35





  D5/0.45ns  IV   75 mls/hr





  AS DIRECT LACEY   Administration


 


Mineral Oil  30 ml  07/26/19 14:00  07/27/19 10:08





  Mineral Oil  PO   30 ml





  Q4H LACEY   Administration


 


Morphine Sulfate  2 mg  07/25/19 14:46  07/26/19 18:47





  Morphine  IV   2 mg





  Q6H PRN   Administration





  Pain, Moderate (4-6)  


 


Ondansetron HCl  4 mg  07/25/19 10:07 





  Zofran  IV  





  Q8H PRN  





  Nausea And Vomiting  


 


Sodium Chloride  10 ml  07/25/19 22:00  07/27/19 10:08





  Sodium Chloride Flush Syringe 10 Ml  IV   10 ml





  BID LACEY   Administration


 


Sodium Chloride  10 ml  07/25/19 10:07 





  Sodium Chloride Flush Syringe 10 Ml  IV  





  PRN PRN  





  LINE FLUSH  














Nutrition/Malnutrition Assess





- Dietary Evaluation


Nutrition/Malnutrition Findings: 


                                        





Nutrition Notes                                            Start:  07/26/19 

17:30


Freq:                                                      Status: Active       




Protocol:                                                                       




 Document     07/26/19 17:30  RM  (Rec: 07/26/19 17:37  RM  [id number])


 Nutrition Notes


     Need for Assessment generated from:         MST


     Initial or Follow up                        Assessment


     Current Diagnosis                           Hypertension,Heart Failure,


                                                 Hyperlipidemia


     Other Pertinent Diagnosis                   Ventral hernia, SBO


     Current Diet                                NPO


     Labs/Tests                                  Reviewed


     Pertinent Medications                       Reviewed


     Height                                      5 ft 7 in


     Weight                                      150.4 kg


     Usual Body Weight                           143.18 kg


     Ideal Body Weight (kg)                      67.27


     BMI                                         51.9


     Subjective/Other Information                Screened for malnutrition.


                                                 NG tube in place for suction


                                                 at time of visit. Pt stated


                                                 that PTA his appetite was poor


                                                 and he has not eaten since


                                                 yesterday. Stated UBW was 315


                                                 lbs 1 month ago.


     Burn                                        Absent


     Trauma                                      Absent


     #1


      Nutrition Diagnosis                        Inadequate oral intake


      Etiology                                   SBO


      As Evidenced by Signs and Symptoms         NPO status


     Is patient on ventilator?                   No


     Is Patient Ambulatory and/or Out of Bed     Yes


     REE-([facility]-ambulatory/OOB) [     2980.419


      [id number]]                               


     Kcal/Kg value to use for calculation        16


     Approximate Energy Requirements Using       2406


      kcal/Kg                                    


     Calculation Used for Recommendations        Kcal/kg


     Additional Notes                            Protein Needs: 87-109g (0.8-1g


                                                 /kg 109 kg adjBW)


                                                 Fluid Needs: 1 ml/kcal


 Nutrition Intervention


     Change Diet Order:                          Advance diet when medically


                                                 able


     Add Supplement/Snack (indicate name/kcal    Ensure Clear 1 daily once diet


      /protein )                                 advanced


     Provides kCal:                              240


     Provides Protein (gm)                       8


     Goal #1                                     Diet advancement


     Anticipated Discharge Needs:                Unable to determine at this


                                                 time


     Follow-Up By:                               07/30/19


     Additional Comments                         Follow for diet advancement

## 2019-07-27 NOTE — XRAY REPORT
Examination: Abdominal radiograph series with PA chest radiograph, 7/27/2019



Clinical information: Abdominal pain. History of small bowel obstruction.



Comparison: Abdominal radiograph series, 7/26/2019 and 7/25/2019



Findings: 



Again noted are several mildly gas-distended loops of bowel throughout the abdomen which do not appea
r significantly changed when compared to the recent previous study.



Review of bony structures demonstrate no evidence of acute bony abnormality. No subdiaphragmatic free
 air is visualized.



The accompanying PA chest radiograph demonstrates no evidence of acute cardiopulmonary process.



Impression:

1. No significant change in gas distended bowel loops throughout the abdomen.



Signer Name: Elke Avendano MD 

Signed: 7/27/2019 12:29 PM

 Workstation Name: [id number]

## 2019-07-27 NOTE — PROGRESS NOTE
Assessment and Plan





Pt refused NG.  states that he's signing out AMA.   + flatus.  





Abd soft,  non tender.   +BS





Abd series had been ordered for 8 am today but still not done.








stable


clinically improved





awaiting abd series if pt does not sign out.











                                Selected Entries











  07/27/19 07/27/19 07/27/19





  06:10 06:42 06:45


 


Temperature 97.9 F  


 


Pulse Rate   85


 


Respiratory   18





Rate   


 


Blood Pressure  138/70 














Objective


                               Vital Signs - 12hr











  07/27/19 07/27/19 07/27/19





  06:10 06:42 06:45


 


Temperature 97.9 F  


 


Pulse Rate 90 64 85


 


Respiratory 20 18 18





Rate   


 


Blood Pressure 171/80 138/70 


 


O2 Sat by Pulse 92 91 95





Oximetry   














- Labs





                                 07/26/19 07:23





                                 07/26/19 07:23

## 2019-07-28 RX ADMIN — MINERAL OIL SCH ML: 1000 SOLUTION ORAL at 21:13

## 2019-07-28 RX ADMIN — FAMOTIDINE SCH MG: 10 INJECTION, SOLUTION INTRAVENOUS at 21:13

## 2019-07-28 RX ADMIN — Medication SCH ML: at 21:13

## 2019-07-28 RX ADMIN — MINERAL OIL SCH ML: 1000 SOLUTION ORAL at 15:57

## 2019-07-28 RX ADMIN — FAMOTIDINE SCH MG: 10 INJECTION, SOLUTION INTRAVENOUS at 10:08

## 2019-07-28 RX ADMIN — Medication SCH ML: at 10:15

## 2019-07-28 RX ADMIN — MINERAL OIL SCH ML: 1000 SOLUTION ORAL at 19:10

## 2019-07-28 RX ADMIN — HYDRALAZINE HYDROCHLORIDE PRN MG: 20 INJECTION INTRAMUSCULAR; INTRAVENOUS at 11:23

## 2019-07-28 RX ADMIN — DEXTROSE AND SODIUM CHLORIDE SCH MLS/HR: 5; .45 INJECTION, SOLUTION INTRAVENOUS at 05:25

## 2019-07-28 RX ADMIN — MINERAL OIL SCH ML: 1000 SOLUTION ORAL at 05:23

## 2019-07-28 RX ADMIN — MINERAL OIL SCH: 1000 SOLUTION ORAL at 01:31

## 2019-07-28 RX ADMIN — MINERAL OIL SCH ML: 1000 SOLUTION ORAL at 10:12

## 2019-07-28 NOTE — PROGRESS NOTE
Assessment and Plan





Pt states passing flatus.  denies nausea,  cramps,  or abd pain.





Abd soft,  non tender





Abd series -  more of an ileus pattern now.





attempt cl liq no carbonated drinks.





Objective


                               Vital Signs - 12hr











  07/27/19 07/27/19 07/27/19





  23:13 23:42 23:53


 


Temperature 98.5 F  


 


Pulse Rate 77  112 H


 


Respiratory 20  20





Rate   


 


Blood Pressure 156/84 156/84 


 


O2 Sat by Pulse 95  100





Oximetry   














  07/28/19





  05:32


 


Temperature 97.7 F


 


Pulse Rate 74


 


Respiratory 24





Rate 


 


Blood Pressure 154/78


 


O2 Sat by Pulse 96





Oximetry 














- Labs





                                 07/26/19 07:23





                                 07/26/19 07:23

## 2019-07-28 NOTE — XRAY REPORT
Examination: Abdominal radiograph series with PA chest radiograph, 7/28/2019



Clinical information: History of small bowel obstruction



Comparison: Abdominal radiograph series, 7/28/2019



Findings: 



Multiple loops of gas-distended bowel overall have increased in prominence since the previous study.



Evaluation of bony structures demonstrate no definitive evidence of acute bony abnormality.



The accompanying PA chest radiograph demonstrates no evidence of acute cardiopulmonary process.



Impression: 

1. Interval increase of multiple loops of gas-distended bowel throughout the abdomen. The appearance 
is suggestive of ileus.



Signer Name: Elke Avendano MD 

Signed: 7/28/2019 8:28 AM

 Workstation Name: [id number]

## 2019-07-28 NOTE — PROGRESS NOTE
Assessment and Plan


Assessment and plan: 





Small bowel obstruction.  Surgery following.  Conservative management for now 

with NPO status, NG tube to intermittent suction and IV fluid hydration.  KUB 

actually appears to be worse.  Patient noncompliant and reports he ate a pack of

cookies last night.





Ventral hernia.  Per surgery.





Hypertension.  IV hydralazine when necessary while nothing by mouth.





Hyperlipidemia.





Morbid obesity.





Probable OHS/KATELIN.  Continue O2





History


Interval history: 


Patient states that he ate a pack of cookies last night.  Patient was counseled 

on importance of NPO





Hospitalist Physical





- Constitutional


Vitals: 


                                        











Temp Pulse Resp BP Pulse Ox


 


 97.7 F   74   24   154/78   96 


 


 07/28/19 05:32  07/28/19 05:32  07/28/19 05:32  07/28/19 05:32  07/28/19 05:32











General appearance: Present: no acute distress, obese





- EENT


Eyes: Present: PERRL, EOM intact


ENT: hearing intact, clear oral mucosa, dentition normal





- Neck


Neck: Present: supple, normal ROM





- Respiratory


Respiratory effort: normal


Respiratory: bilateral: CTA





- Cardiovascular


Rhythm: regular


Heart Sounds: Present: S1 & S2.  Absent: gallop, rub





- Extremities


Extremities: no ischemia, No edema, Full ROM





- Abdominal


General gastrointestinal: soft, non-tender, non-distended, normal bowel sounds





- Integumentary


Integumentary: Present: clear, warm, dry





- Neurologic


Neurologic: CNII-XII intact, moves all extremities





Results





- Labs


CBC & Chem 7: 


                                 07/26/19 07:23





                                 07/26/19 07:23


Labs: 


                             Laboratory Last Values











WBC  9.8 K/mm3 (4.5-11.0)   07/26/19  07:23    


 


RBC  4.74 M/mm3 (3.65-5.03)   07/26/19  07:23    


 


Hgb  13.5 gm/dl (11.8-15.2)   07/26/19  07:23    


 


Hct  40.6 % (35.5-45.6)   07/26/19  07:23    


 


MCV  86 fl (84-94)   07/26/19  07:23    


 


MCH  29 pg (28-32)   07/26/19  07:23    


 


MCHC  33 % (32-34)   07/26/19  07:23    


 


RDW  14.8 % (13.2-15.2)   07/26/19  07:23    


 


Plt Count  354 K/mm3 (140-440)   07/26/19  07:23    


 


Lymph % (Auto)  30.4 % (13.4-35.0)   07/26/19  07:23    


 


Mono % (Auto)  9.0 % (0.0-7.3)  H  07/26/19  07:23    


 


Eos % (Auto)  2.5 % (0.0-4.3)   07/26/19  07:23    


 


Baso % (Auto)  0.4 % (0.0-1.8)   07/26/19  07:23    


 


Lymph #  3.0 K/mm3 (1.2-5.4)   07/26/19  07:23    


 


Mono #  0.9 K/mm3 (0.0-0.8)  H  07/26/19  07:23    


 


Eos #  0.2 K/mm3 (0.0-0.4)   07/26/19  07:23    


 


Baso #  0.0 K/mm3 (0.0-0.1)   07/26/19  07:23    


 


Seg Neutrophils %  57.7 % (40.0-70.0)   07/26/19  07:23    


 


Seg Neutrophils #  5.7 K/mm3 (1.8-7.7)   07/26/19  07:23    


 


PT  12.8 Sec. (12.2-14.9)   07/25/19  03:20    


 


INR  0.99  (0.87-1.13)   07/25/19  03:20    


 


APTT  26.5 Sec. (24.2-36.6)   07/25/19  03:20    


 


Sodium  143 mmol/L (137-145)   07/26/19  07:23    


 


Potassium  3.7 mmol/L (3.6-5.0)   07/26/19  07:23    


 


Chloride  103.3 mmol/L ()   07/26/19  07:23    


 


Carbon Dioxide  30 mmol/L (22-30)   07/26/19  07:23    


 


  13 mmol/L  07/26/19  07:23    


 


BUN  11 mg/dL (9-20)   07/26/19  07:23    


 


  1.0 mg/dL (0.8-1.5)   07/26/19  07:23    


 


Estimated GFR  > 60 ml/min  07/26/19  07:23    


 


  11 %  07/26/19  07:23    


 


Glucose  106 mg/dL ()  H  07/26/19  07:23    


 


Lactic Acid  1.60 mmol/L (0.7-2.0)   07/25/19  03:20    


 


Calcium  9.1 mg/dL (8.4-10.2)   07/26/19  07:23    


 


  0.60 mg/dL (0.1-1.2)   07/26/19  07:23    


 


AST  15 units/L (5-40)   07/26/19  07:23    


 


ALT  19 units/L (7-56)   07/26/19  07:23    


 


  74 units/L ()   07/26/19  07:23    


 


  < 0.010 ng/mL (0.00-0.029)   07/25/19  03:20    


 


  7.8 g/dL (6.3-8.2)   07/26/19  07:23    


 


  4.0 g/dL (3.9-5)   07/26/19  07:23    


 


  1.1 %  07/26/19  07:23    


 


  40 units/L (13-60)   07/25/19  03:20    


 


  Yellow  (Yellow)   07/25/19  04:32    


 


  Clear  (Clear)   07/25/19  04:32    


 


  5.0  (5.0-7.0)   07/25/19  04:32    


 


Ur Specific Gravity  1.018  (1.003-1.030)   07/25/19  04:32    


 


  30 mg/dl mg/dL (Negative)   07/25/19  04:32    


 


  Neg mg/dL (Negative)   07/25/19  04:32    


 


  Neg mg/dL (Negative)   07/25/19  04:32    


 


  Mod  (Negative)   07/25/19  04:32    


 


  Neg  (Negative)   07/25/19  04:32    


 


  Neg  (Negative)   07/25/19  04:32    


 


  < 2.0 mg/dL (<2.0)   07/25/19  04:32    


 


Ur Leukocyte Esterase  Neg  (Negative)   07/25/19  04:32    


 


  1.0 /HPF (0.0-6.0)   07/25/19  04:32    


 


  3.0 /HPF (0.0-6.0)   07/25/19  04:32    


 


U Epithel Cells (Auto)  < 1.0 /HPF (0-13.0)   07/25/19  04:32    


 


  Few /HPF  07/25/19  04:32    














Active Medications





- Current Medications


Current Medications: 














Generic Name Dose Route Start Last Admin





  Trade Name Freq  PRN Reason Stop Dose Admin


 


Acetaminophen  650 mg  07/25/19 10:07  07/26/19 21:07





  Tylenol  PO   650 mg





  Q4H PRN   Administration





  Pain MILD(1-3)/Fever >100.5/HA  


 


Famotidine  20 mg  07/25/19 13:00  07/27/19 21:34





  Pepcid  IV   20 mg





  BID LACEY   Administration


 


Hydralazine HCl  10 mg  07/26/19 11:11  07/27/19 23:42





  Apresoline  IV   10 mg





  Q6H PRN   Administration





  HTN SBP>150  


 


Dextrose/Sodium Chloride  1,000 mls @ 75 mls/hr  07/25/19 11:00  07/28/19 05:25





  D5/0.45ns  IV   75 mls/hr





  AS DIRECT LACEY   Administration


 


Mineral Oil  30 ml  07/26/19 14:00  07/28/19 05:23





  Mineral Oil  PO   30 ml





  Q4H LACEY   Administration


 


Morphine Sulfate  2 mg  07/25/19 14:46  07/26/19 18:47





  Morphine  IV   2 mg





  Q6H PRN   Administration





  Pain, Moderate (4-6)  


 


Ondansetron HCl  4 mg  07/25/19 10:07 





  Zofran  IV  





  Q8H PRN  





  Nausea And Vomiting  


 


Sodium Chloride  10 ml  07/25/19 22:00  07/27/19 21:35





  Sodium Chloride Flush Syringe 10 Ml  IV   10 ml





  BID LACEY   Administration


 


Sodium Chloride  10 ml  07/25/19 10:07 





  Sodium Chloride Flush Syringe 10 Ml  IV  





  PRN PRN  





  LINE FLUSH  














Nutrition/Malnutrition Assess





- Dietary Evaluation


Nutrition/Malnutrition Findings: 


                                        





Nutrition Notes                                            Start:  07/26/19 

17:30


Freq:                                                      Status: Active       




Protocol:                                                                       




 Document     07/26/19 17:30  RM  (Rec: 07/26/19 17:37  RM  [id number])


 Nutrition Notes


     Need for Assessment generated from:         MST


     Initial or Follow up                        Assessment


     Current Diagnosis                           Hypertension,Heart Failure,


                                                 Hyperlipidemia


     Other Pertinent Diagnosis                   Ventral hernia, SBO


     Current Diet                                NPO


     Labs/Tests                                  Reviewed


     Pertinent Medications                       Reviewed


     Height                                      5 ft 7 in


     Weight                                      150.4 kg


     Usual Body Weight                           143.18 kg


     Ideal Body Weight (kg)                      67.27


     BMI                                         51.9


     Subjective/Other Information                Screened for malnutrition.


                                                 NG tube in place for suction


                                                 at time of visit. Pt stated


                                                 that PTA his appetite was poor


                                                 and he has not eaten since


                                                 yesterday. Stated UBW was 315


                                                 lbs 1 month ago.


     Burn                                        Absent


     Trauma                                      Absent


     #1


      Nutrition Diagnosis                        Inadequate oral intake


      Etiology                                   SBO


      As Evidenced by Signs and Symptoms         NPO status


     Is patient on ventilator?                   No


     Is Patient Ambulatory and/or Out of Bed     Yes


     REE-([facility]-ambulatory/OOB) [     [id number]


      [email or web address]]                               


     Kcal/Kg value to use for calculation        16


     Approximate Energy Requirements Using       2406


      kcal/Kg                                    


     Calculation Used for Recommendations        Kcal/kg


     Additional Notes                            Protein Needs: 87-109g (0.8-1g


                                                 /kg 109 kg adjBW)


                                                 Fluid Needs: 1 ml/kcal


 Nutrition Intervention


     Change Diet Order:                          Advance diet when medically


                                                 able


     Add Supplement/Snack (indicate name/kcal    Ensure Clear 1 daily once diet


      /protein )                                 advanced


     Provides kCal:                              240


     Provides Protein (gm)                       8


     Goal #1                                     Diet advancement


     Anticipated Discharge Needs:                Unable to determine at this


                                                 time


     Follow-Up By:                               07/30/19


     Additional Comments                         Follow for diet advancement

## 2019-07-29 VITALS — DIASTOLIC BLOOD PRESSURE: 96 MMHG | SYSTOLIC BLOOD PRESSURE: 185 MMHG

## 2019-07-29 RX ADMIN — HYDRALAZINE HYDROCHLORIDE PRN MG: 20 INJECTION INTRAMUSCULAR; INTRAVENOUS at 00:04

## 2019-07-29 RX ADMIN — FAMOTIDINE SCH MG: 10 INJECTION, SOLUTION INTRAVENOUS at 10:32

## 2019-07-29 RX ADMIN — Medication SCH ML: at 10:37

## 2019-07-29 RX ADMIN — MINERAL OIL SCH ML: 1000 SOLUTION ORAL at 10:32

## 2019-07-29 RX ADMIN — DEXTROSE AND SODIUM CHLORIDE SCH MLS/HR: 5; .45 INJECTION, SOLUTION INTRAVENOUS at 00:04

## 2019-07-29 RX ADMIN — MINERAL OIL SCH ML: 1000 SOLUTION ORAL at 06:38

## 2019-07-29 RX ADMIN — MINERAL OIL SCH: 1000 SOLUTION ORAL at 02:00

## 2019-07-29 NOTE — PROGRESS NOTE
Subjective


Date of service: 07/29/19


Interval history: 





A/P:


Small bowel obstruction.  Surgery following.  Conservative management .  Started

on clear liquid diet .


  KUB actually appears to be worse.  Patient noncompliant and reports he ate a 

pack of cookies 


He has no complaints and he states that he is ready to go home


He denies any abdominal pain nausea or vomiting


?  Advanced to full liquids/ we will wait for follow-up by general surgery





 Large Ventral hernia.:  No evidence of  obstruction or incarceration


Surgery following





Hypertension.  VS 


Reviewed reviewed


Restart on amlodipine 5 mg


Hold off on lisinopril and hydrochlorothiazide for now


IV hydralazine when necessary 





Hyperlipidemia.  Not on statin for review of her medications





Morbid obesity.


Likely secondary to excess calories


Importance of diet and weight loss and long-term side effects of obesity 

explained to the patient





Subjective


Patient is alert and oriented and offers no specific complaints.


He denies any fever or chills nausea vomiting or abdominal pain


12 point review of systems is essentially negative





Objective





- Constitutional


Vitals: 


                               Vital Signs - 12hr











  07/29/19 07/29/19





  00:04 05:38


 


Temperature  98.4 F


 


Pulse Rate  77


 


Respiratory  22





Rate  


 


Blood Pressure 152/83 147/80


 


O2 Sat by Pulse  96





Oximetry  











General appearance: Present: no acute distress, obese





- EENT


Eyes: PERRL, EOM intact


ENT: hearing intact, clear oral mucosa





- Neck


Neck: supple, normal ROM





- Respiratory


Respiratory effort: normal


Respiratory: bilateral: CTA





- Cardiovascular


Rhythm: regular


Heart Sounds: Present: S1 & S2


Extremities: No edema





- Gastrointestinal


General gastrointestinal: Present: soft, non-tender, hernia (large ventral 

hernia in the right mid abdomen)


Rectal Exam: deferred





- Genitourinary


Male genitourinary: deferred





- Integumentary


Integumentary: clear





- Musculoskeletal


Musculoskeletal: strength equal bilaterally





- Neurologic


Neurologic: CNII-XII intact





- Psychiatric


Psychiatric: appropriate mood/affect





- Labs


CBC & Chem 7: 


                                 07/26/19 07:23





                                 07/26/19 07:23

## 2019-07-29 NOTE — DISCHARGE SUMMARY
Providers





- Providers


Date of Admission: 


07/25/19 09:05





Date of discharge: 07/29/19


Attending physician: 


DESHAWN GARCIA





                                        





07/25/19 08:08


Consult to Physician [CONS] Urgent 


   Comment: 


   Consulting Provider: RAYMOND BARTLETT


   Physician Instructions: 


   Reason For Exam: sbo











[facility] 


[facility]








Hospitalization


Reason for admission: small bowel obstruction


Condition: Stable


Hospital course: 





A/P:


Small bowel obstruction.  Surgery following.  Conservative management .  Started

 on clear liquid diet .


  KUB actually appears to be worse.  Patient noncompliant and reports he ate a 

pack of cookies 


He has no complaints and he states that he is ready to go home


He denies any abdominal pain nausea or vomiting


?  Advanced to full liquids/ we will wait for follow-up by general surgery





 Large Ventral hernia.:  No evidence of  obstruction or incarceration


Surgery following





Hypertension.  VS 


Reviewed reviewed


Restart on amlodipine 5 mg


Hold off on lisinopril and hydrochlorothiazide for now


IV hydralazine when necessary 





Hyperlipidemia.  Not on statin for review of her medications





Morbid obesity.


Likely secondary to excess calories


Importance of diet and weight loss and long-term side effects of obesity 

explained to the patient





Patient signed out AGAINST MEDICAL ADVICE


Disposition: DC-07 LEFT AGAINST MED ADVICE





Core Measure Documentation





- Palliative Care


Palliative Care/ Comfort Measures: Not Applicable





- Core Measures


Any of the following diagnoses?: none





Exam





- Constitutional


Vitals: 


                                        











Temp Pulse Resp BP Pulse Ox


 


 99.0 F   92 H  20   185/96   91 


 


 07/29/19 11:41  07/29/19 11:41  07/29/19 11:41  07/29/19 11:41  07/29/19 11:41











General appearance: Present: no acute distress





- EENT


Eyes: Present: PERRL, EOM intact


ENT: hearing intact





- Neck


Neck: Present: supple





- Respiratory


Respiratory effort: normal


Respiratory: bilateral: CTA





- Cardiovascular


Rhythm: regular


Heart Sounds: Present: S1 & S2





- Extremities


Extremities: No edema





- Abdominal


General gastrointestinal: Present: soft, non-tender, hernia (large ventral 

hernia)





Plan


Follow up with: 


[facility], MD [Primary Care Provider] - 3-5 Days

## 2019-10-26 ENCOUNTER — HOSPITAL ENCOUNTER (EMERGENCY)
Dept: HOSPITAL 5 - ED | Age: 54
Discharge: HOME | End: 2019-10-26
Payer: SELF-PAY

## 2019-10-26 VITALS — DIASTOLIC BLOOD PRESSURE: 86 MMHG | SYSTOLIC BLOOD PRESSURE: 143 MMHG

## 2019-10-26 DIAGNOSIS — K43.9: Primary | ICD-10-CM

## 2019-10-26 LAB
ALBUMIN SERPL-MCNC: 4.1 G/DL (ref 3.9–5)
ALT SERPL-CCNC: 14 UNITS/L (ref 7–56)
BASOPHILS # (AUTO): 0 K/MM3 (ref 0–0.1)
BASOPHILS NFR BLD AUTO: 0.3 % (ref 0–1.8)
BUN SERPL-MCNC: 15 MG/DL (ref 9–20)
BUN/CREAT SERPL: 13 %
CALCIUM SERPL-MCNC: 9.5 MG/DL (ref 8.4–10.2)
EOSINOPHIL # BLD AUTO: 0.2 K/MM3 (ref 0–0.4)
EOSINOPHIL NFR BLD AUTO: 2.2 % (ref 0–4.3)
HCT VFR BLD CALC: 39.5 % (ref 35.5–45.6)
HEMOLYSIS INDEX: 1
HGB BLD-MCNC: 13.1 GM/DL (ref 11.8–15.2)
LYMPHOCYTES # BLD AUTO: 4 K/MM3 (ref 1.2–5.4)
LYMPHOCYTES NFR BLD AUTO: 42.3 % (ref 13.4–35)
MCHC RBC AUTO-ENTMCNC: 33 % (ref 32–34)
MCV RBC AUTO: 82 FL (ref 84–94)
MONOCYTES # (AUTO): 1.2 K/MM3 (ref 0–0.8)
MONOCYTES % (AUTO): 12.4 % (ref 0–7.3)
PLATELET # BLD: 323 K/MM3 (ref 140–440)
RBC # BLD AUTO: 4.84 M/MM3 (ref 3.65–5.03)

## 2019-10-26 PROCEDURE — 96375 TX/PRO/DX INJ NEW DRUG ADDON: CPT

## 2019-10-26 PROCEDURE — 80053 COMPREHEN METABOLIC PANEL: CPT

## 2019-10-26 PROCEDURE — 99284 EMERGENCY DEPT VISIT MOD MDM: CPT

## 2019-10-26 PROCEDURE — 74177 CT ABD & PELVIS W/CONTRAST: CPT

## 2019-10-26 PROCEDURE — 36415 COLL VENOUS BLD VENIPUNCTURE: CPT

## 2019-10-26 PROCEDURE — 96374 THER/PROPH/DIAG INJ IV PUSH: CPT

## 2019-10-26 PROCEDURE — 85025 COMPLETE CBC W/AUTO DIFF WBC: CPT

## 2019-10-26 PROCEDURE — 83690 ASSAY OF LIPASE: CPT

## 2019-10-26 NOTE — EMERGENCY DEPARTMENT REPORT
ED Abdominal Pain HPI





- General


Chief Complaint: Abdominal Pain


Stated Complaint: SOB,ABDOMINAL PAIN


Time Seen by Provider: 10/26/19 06:14


Source: patient


Mode of arrival: Ambulatory


Limitations: No Limitations





- History of Present Illness


Initial Comments: 


This 54-year-old male who is screened by the prior emergency physician.  The 

patient was concerned about his chronic ventral hernia.  He complained of 

anterior abdominal pain.  He was not vomiting.  He stated "busted 2 years ago". 

He was admitted to this facility as below indicated in July 2019 and discharged 

without intervention.





He is resting comfortably.  He states "I feel good now".  He also states he 

tolerated the oral contrast and oriented he drank some fluids well.  He does not

refer any fever or chills.





Patient states he will be scheduled for hernia repair by Dr. Villalta at [facility] soon.





Hospitalization


Reason for admission: small bowel obstruction


Condition: Stable


Hospital course: 





A/P:


Small bowel obstruction.  Surgery following.  Conservative management .  Started

on clear liquid diet .


  KUB actually appears to be worse.  Patient noncompliant and reports he ate a 

pack of cookies 


He has no complaints and he states that he is ready to go home


He denies any abdominal pain nausea or vomiting


?  Advanced to full liquids/ we will wait for follow-up by general surgery





 Large Ventral hernia.:  No evidence of  obstruction or incarceration


Surgery following





Hypertension.  VS 


Reviewed reviewed


Restart on amlodipine 5 mg


Hold off on lisinopril and hydrochlorothiazide for now


IV hydralazine when necessary 





Hyperlipidemia.  Not on statin for review of her medications





Morbid obesity.


Likely secondary to excess calories


Importance of diet and weight loss and long-term side effects of obesity 

explained to the patient





Patient signed out AGAINST MEDICAL ADVICE


MD Complaint: abdominal pain


-: Gradual, hour(s)


Location: diffuse (anterior)


Radiation: none


Migration to: no migration


Severity: moderate


Quality: aching


Consistency: now resolved


Improves With: nothing


Worsens With: nothing


Context: other (ventral hernia)


Associated Symptoms: denies other symptoms





- Related Data


                                Home Medications











 Medication  Instructions  Recorded  Confirmed  Last Taken


 


Amlodipine Besylate [Norvasc] 10 mg PO DAILY 07/26/19 07/26/19 07/25/19 08:00


 


Furosemide [Lasix TAB] 40 mg PO BID 07/26/19 07/26/19 07/24/19 22:00


 


Lisinopril [Zestril TAB] 20 mg PO DAILY 07/26/19 07/26/19 07/25/19 08:00


 


hydroCHLOROthiazide [HCTZ] 25 mg PO DAILY 07/26/19 07/26/19 07/25/19 08:00








                                  Previous Rx's











 Medication  Instructions  Recorded  Last Taken  Type


 


traMADol [Ultram 50 MG tab] 50 mg PO Q6HR PRN #7 tablet 10/26/19 Unknown Rx











                                    Allergies











Allergy/AdvReac Type Severity Reaction Status Date / Time


 


No Known Allergies Allergy   Verified 07/25/19 06:55














ED Review of Systems


ROS: 


Stated complaint: SOB,ABDOMINAL PAIN


Other details as noted in HPI





Constitutional: denies: chills, fever


Eyes: denies: eye pain, eye discharge, vision change


ENT: denies: ear pain, throat pain


Respiratory: denies: cough, shortness of breath, wheezing


Cardiovascular: denies: chest pain, palpitations


Endocrine: no symptoms reported


Gastrointestinal: abdominal pain.  denies: nausea, diarrhea


Genitourinary: denies: urgency, dysuria


Musculoskeletal: denies: back pain, joint swelling, arthralgia


Skin: denies: rash, lesions


Neurological: denies: headache, weakness, paresthesias


Psychiatric: denies: anxiety, depression


Hematological/Lymphatic: denies: easy bleeding, easy bruising





ED Past Medical Hx





- Past Medical History


Previous Medical History?: Yes


Hx Hypertension: Yes


Hx Congestive Heart Failure: Yes


Hx Diabetes: No


Hx Asthma: No


Hx COPD: No


Hx HIV: No


Additional medical history: Sleep apnea





- Surgical History


Past Surgical History?: Yes


Hx Cholecystectomy: Yes


Additional Surgical History: hernia sx





- Social History


Smoking Status: Never Smoker


Substance Use Type: None





- Medications


Home Medications: 


                                Home Medications











 Medication  Instructions  Recorded  Confirmed  Last Taken  Type


 


Amlodipine Besylate [Norvasc] 10 mg PO DAILY 07/26/19 07/26/19 07/25/19 08:00 

History


 


Furosemide [Lasix TAB] 40 mg PO BID 07/26/19 07/26/19 07/24/19 22:00 History


 


Lisinopril [Zestril TAB] 20 mg PO DAILY 07/26/19 07/26/19 07/25/19 08:00 History


 


hydroCHLOROthiazide [HCTZ] 25 mg PO DAILY 07/26/19 07/26/19 07/25/19 08:00 

History


 


traMADol [Ultram 50 MG tab] 50 mg PO Q6HR PRN #7 tablet 10/26/19  Unknown Rx














ED Physical Exam





- General


Limitations: No Limitations


General appearance: alert, in no apparent distress





- Head


Head exam: Present: atraumatic, normocephalic





- Eye


Eye exam: Present: normal appearance.  Absent: scleral icterus





- ENT


ENT exam: Present: mucous membranes moist





- Neck


Neck exam: Present: normal inspection





- Respiratory


Respiratory exam: Present: normal lung sounds bilaterally.  Absent: respiratory 

distress





- Cardiovascular


Cardiovascular Exam: Present: regular rate, normal rhythm.  Absent: systolic 

murmur, diastolic murmur, rubs, gallop





- GI/Abdominal


GI/Abdominal exam: Present: soft, normal bowel sounds, hernia (ventral hernia 

present which is reducible), other.  Absent: tenderness, guarding, rebound, 

rigid





- Rectal


Rectal exam: Present: deferred





- Extremities Exam


Extremities exam: Present: normal inspection





- Back Exam


Back exam: Present: normal inspection





- Neurological Exam


Neurological exam: Present: alert, oriented X3, CN II-XII intact.  Absent: motor

sensory deficit





- Psychiatric


Psychiatric exam: Present: normal affect, normal mood





- Skin


Skin exam: Present: warm, dry, intact, normal color.  Absent: rash





ED Course


                                   Vital Signs











  10/26/19 10/26/19





  04:00 04:33


 


Temperature 98.9 F 98.9 F


 


Pulse Rate 82 85


 


Respiratory 20 16





Rate  


 


Blood Pressure 145/89 133/76





[Right]  


 


O2 Sat by Pulse 95 95





Oximetry  














- Reevaluation(s)


Reevaluation #1: 


Patient is now appropriate for outpatient disposition.


10/26/19 06:31








ED Medical Decision Making





- Lab Data


Result diagrams: 


                                 10/26/19 04:18





                                 10/26/19 04:18








                         Laboratory Results - last 24 hr











  10/26/19 10/26/19





  04:18 04:18


 


WBC  9.5 


 


RBC  4.84 


 


Hgb  13.1 


 


Hct  39.5 


 


MCV  82 L 


 


MCH  27 L 


 


MCHC  33 


 


RDW  15.8 H 


 


Plt Count  323 


 


Lymph % (Auto)  42.3 H 


 


Mono % (Auto)  12.4 H 


 


Eos % (Auto)  2.2 


 


Baso % (Auto)  0.3 


 


Lymph #  4.0 


 


Mono #  1.2 H 


 


Eos #  0.2 


 


Baso #  0.0 


 


Seg Neutrophils %  42.8 


 


Seg Neutrophils #  4.1 


 


Sodium   139


 


Potassium   3.8


 


Chloride   98.1


 


Carbon Dioxide   29


 


Anion Gap   16


 


BUN   15


 


Creatinine   1.2


 


Estimated GFR   > 60


 


BUN/Creatinine Ratio   13


 


Glucose   108 H


 


Calcium   9.5


 


Total Bilirubin   0.20


 


AST   17


 


ALT   14


 


Alkaline Phosphatase   62


 


Total Protein   7.5


 


Albumin   4.1


 


Albumin/Globulin Ratio   1.2


 


Lipase   129 H














- Radiology Data


Radiology results: report reviewed, image reviewed


IMPRESSION:


1. No significant change in multiple abdominal wall hernia sacs containing 

multiple loops of small


bowel


2. Resolution of prior small bowel obstruction. The small bowel pattern now is 

nonspecific with


mild distention with fluid but without dilatation. Pattern possibly could relate

 to mild enteritis.








Critical care attestation.: 


If time is entered above; I have spent that time in minutes in the direct care 

of this critically ill patient, excluding procedure time.








ED Disposition


Clinical Impression: 


Ventral hernia


Qualifiers:


 Obstruction and gangrene presence: without obstruction or gangrene Qualified 

Code(s): K43.9 - Ventral hernia without obstruction or gangrene





Abdominal pain


Qualifiers:


 Abdominal location: upper abdomen, unspecified Qualified Code(s): R10.10 - 

Upper abdominal pain, unspecified





Disposition: DC-01 TO HOME OR SELFCARE


Is pt being admited?: No


Does the pt Need Aspirin: No


Condition: Stable


Instructions:  Acute Abdominal Pain (ED), Abdominal Pain (ED), Ventral Hernia 

(ED)


Additional Instructions: 


Return as needed any significant pain, fever, chills, nausea, vomiting.  Follow 

up with her surgeon as planned.  Use your CPAP machine.


Prescriptions: 


traMADol [Ultram 50 MG tab] 50 mg PO Q6HR PRN #7 tablet


 PRN Reason: Pain


Referrals: 


usual, surgeon [Other] - 2-3 Days


Time of Disposition: 06:33

## 2019-10-26 NOTE — EMERGENCY DEPARTMENT REPORT
Blank Doc





- Documentation


Documentation: 





54-year-old male presents to the hospital complaining of abd pain since yester

day.  Patient has history of ventral hernia and multiple abdominal surgeries.  

He thinks that his abdomen is more swollen today usual.  Nausea without vomiting

reported and last bowel movement was yesterday.  As per pt's medical record 

review he was admitted here in the past for suspected bowel obstruction.  Labs, 

CT with oral and IV contrast , Zofran, and Dilaudid for pain ordered pending on 

coming doctor evaluation and follow-up.

## 2019-10-26 NOTE — CAT SCAN REPORT
CT ABDOMEN AND PELVIS WITH CONTRAST



INDICATION: Mid abd pain hx of ventral hernia



CONTRAST: Oral, 100 cc Omnipaque 300 IV



COMPARISON: 7/25/2019



All CT scans at this location are performed using CT dose reduction for ALARA by means of automated e
xposure control.



NOTE: Resolution is decreased and artifact is introduced by the patient's size.



FINDINGS: Lung bases show mild atelectatic change. No pneumoperitoneum is seen. The multiple abdomina
l wall midline and paramedian hernias are again noted containing multiple loops of small bowel. No si
gnificant change is seen in the appearance of these hernias.

On the right has its outermost portion not fully seen in the field-of-view. Mild stranding in the are
a of this hernia is not significantly changed and appear to be a chronic finding. Previously there wa
s evidence of small bowel obstruction which is no longer seen. I do not see bowel dilatation. There a
re some distended loops of small bowel with fluid but no wall thickening is seen. Gas and stool are s
een throughout the colon. Appendix appears within normal limits. Bilateral renal cysts are again seen
. No urinary obstructive changes are noted. Mild fatty infiltration of the liver with mild hepatomega
ly are again seen. No other masses are noted. No lymphadenopathy is seen. No free fluid is noted.





IMPRESSION: 

1. No significant change in multiple abdominal wall hernia sacs containing multiple loops of small kay
wel

2. Resolution of prior small bowel obstruction. The small bowel pattern now is nonspecific with mild 
distention with fluid but without dilatation. Pattern possibly could relate to mild enteritis.





Signer Name: Cristopher Gardiner MD 

Signed: 10/26/2019 6:09 AM

 Workstation Name: [id number]

## 2020-05-04 ENCOUNTER — HOSPITAL ENCOUNTER (INPATIENT)
Dept: HOSPITAL 5 - ED | Age: 55
LOS: 1 days | Discharge: HOME | DRG: 393 | End: 2020-05-05
Attending: INTERNAL MEDICINE | Admitting: INTERNAL MEDICINE
Payer: SELF-PAY

## 2020-05-04 DIAGNOSIS — J96.01: ICD-10-CM

## 2020-05-04 DIAGNOSIS — Z90.49: ICD-10-CM

## 2020-05-04 DIAGNOSIS — I11.0: ICD-10-CM

## 2020-05-04 DIAGNOSIS — Z83.3: ICD-10-CM

## 2020-05-04 DIAGNOSIS — K43.6: Primary | ICD-10-CM

## 2020-05-04 DIAGNOSIS — N39.0: ICD-10-CM

## 2020-05-04 DIAGNOSIS — K43.2: ICD-10-CM

## 2020-05-04 DIAGNOSIS — Z87.891: ICD-10-CM

## 2020-05-04 DIAGNOSIS — E66.2: ICD-10-CM

## 2020-05-04 DIAGNOSIS — Z91.14: ICD-10-CM

## 2020-05-04 DIAGNOSIS — Z71.3: ICD-10-CM

## 2020-05-04 DIAGNOSIS — Z82.49: ICD-10-CM

## 2020-05-04 DIAGNOSIS — I50.9: ICD-10-CM

## 2020-05-04 DIAGNOSIS — I50.22: ICD-10-CM

## 2020-05-04 LAB
ALBUMIN SERPL-MCNC: 4.4 G/DL (ref 3.9–5)
ALT SERPL-CCNC: 20 UNITS/L (ref 7–56)
BACTERIA #/AREA URNS HPF: (no result) /HPF
BILIRUB UR QL STRIP: (no result)
BLOOD UR QL VISUAL: (no result)
BUN SERPL-MCNC: 11 MG/DL (ref 9–20)
BUN/CREAT SERPL: 10 %
CALCIUM SERPL-MCNC: 9.9 MG/DL (ref 8.4–10.2)
HCO3 BLDA-SCNC: 28.7 MMOL/L (ref 20–26)
HCT VFR BLD CALC: 47.9 % (ref 35.5–45.6)
HEMOLYSIS INDEX: 6
HGB BLD-MCNC: 15 GM/DL (ref 11.8–15.2)
MCHC RBC AUTO-ENTMCNC: 31 % (ref 32–34)
MCV RBC AUTO: 83 FL (ref 84–94)
MUCOUS THREADS #/AREA URNS HPF: (no result) /HPF
PCO2 BLDA: 47.3 MM HG
PH BLDA: 7.4 PH UNITS (ref 7.35–7.45)
PH UR STRIP: 5 [PH] (ref 5–7)
PLATELET # BLD: 391 K/MM3 (ref 140–440)
PO2 BLDA: 64 MM HG (ref 80–90)
RBC # BLD AUTO: 5.79 M/MM3 (ref 3.65–5.03)
RBC #/AREA URNS HPF: 21 /HPF (ref 0–6)
UROBILINOGEN UR-MCNC: < 2 MG/DL (ref ?–2)
WBC #/AREA URNS HPF: 113 /HPF (ref 0–6)

## 2020-05-04 PROCEDURE — 85027 COMPLETE CBC AUTOMATED: CPT

## 2020-05-04 PROCEDURE — 82803 BLOOD GASES ANY COMBINATION: CPT

## 2020-05-04 PROCEDURE — 74177 CT ABD & PELVIS W/CONTRAST: CPT

## 2020-05-04 PROCEDURE — 4A033R1 MEASUREMENT OF ARTERIAL SATURATION, PERIPHERAL, PERCUTANEOUS APPROACH: ICD-10-PCS | Performed by: INTERNAL MEDICINE

## 2020-05-04 PROCEDURE — 94760 N-INVAS EAR/PLS OXIMETRY 1: CPT

## 2020-05-04 PROCEDURE — 81001 URINALYSIS AUTO W/SCOPE: CPT

## 2020-05-04 PROCEDURE — 83690 ASSAY OF LIPASE: CPT

## 2020-05-04 PROCEDURE — 84484 ASSAY OF TROPONIN QUANT: CPT

## 2020-05-04 PROCEDURE — 80053 COMPREHEN METABOLIC PANEL: CPT

## 2020-05-04 PROCEDURE — 83880 ASSAY OF NATRIURETIC PEPTIDE: CPT

## 2020-05-04 PROCEDURE — 85025 COMPLETE CBC W/AUTO DIFF WBC: CPT

## 2020-05-04 PROCEDURE — 71275 CT ANGIOGRAPHY CHEST: CPT

## 2020-05-04 PROCEDURE — 36415 COLL VENOUS BLD VENIPUNCTURE: CPT

## 2020-05-04 PROCEDURE — 71045 X-RAY EXAM CHEST 1 VIEW: CPT

## 2020-05-04 PROCEDURE — 80048 BASIC METABOLIC PNL TOTAL CA: CPT

## 2020-05-04 PROCEDURE — 93005 ELECTROCARDIOGRAM TRACING: CPT

## 2020-05-04 RX ADMIN — HEPARIN SODIUM SCH UNIT: 5000 INJECTION, SOLUTION INTRAVENOUS; SUBCUTANEOUS at 22:26

## 2020-05-04 RX ADMIN — Medication SCH ML: at 22:26

## 2020-05-04 NOTE — CAT SCAN REPORT
CTA chest with contrast

CT abdomen and pelvis with contrast



INDICATION : Hypoxia.  Acute generalized abdominal pain and vomiting



TECHNIQUE:  Axial imaging performed through the chest, with contrast bolus timing set to maximize opa
cification of the pulmonary arteries. 3-plane MIP reformatted images were obtained. Axial imaging was
 also performed through the abdomen and pelvis. All CT scans at this location are performed using CT 
dose reduction for ALARA by means of automated exposure control. 



100 mL of intravenous contrast administered.



COMPARISON:  CT abdomen from 10/26/2019



FINDINGS:



CHEST:



Bolus:  Contrast bolus timing is adequate; however, there is severe respiratory motion artifact in th
e lung bases which essentially yields this portion of the exam nondiagnostic.

PTE:  No filling defect is present to suggest PTE in the mid to upper lung zones. Again, the lung bas
es are not adequately evaluated on this exam.

Mediastinum:  Heart and great vessels appear normal.  No pathologic mediastinal adenopathy.

Lungs:  Significant motion artifact is present in the lung bases, with no gross acute abnormality rama
ntified. Moderate emphysematous changes are present.

Bones:  Degenerative changes in the spine with nothing acute.



ABDOMEN/PELVIS:



The liver, spleen, pancreas, and right kidney appear unremarkable. There is mild bilateral adrenal th
ickening. Simple cysts are again seen within the kidneys.



There is an irregular ventral wall hernia containing multiple loops of bowel. The small bowel leading
 up to this area is abnormally dilated and fluid-filled consistent with obstruction. There is mild in
flammation involving a portion of the bowel in the midline hernia.



No significant pelvic free fluid. Bladder is mostly collapsed. Prostate is normal in size. No acute c
olonic abnormality identified. The appendix is normal.



IMPRESSION: 

1. Negative for PTE in the mid to upper lung zones. The lung bases are not adequately evaluated becau
se of severe motion as described.

2. Recurrent small bowel obstruction tracking to an irregular ventral wall hernia.



Signer Name: Volodymyr Goss MD 

Signed: 5/4/2020 7:00 PM

Workstation Name: [id number]

## 2020-05-04 NOTE — HISTORY AND PHYSICAL REPORT
History of Present Illness


Chief complaint: 





My stomach hurts


History of present illness: 


54 YO Male with Obesity Hypoventilation Syndrome, HTN, Systolic CHF, KATELIN on CPAP

presents to ED for evaluation.  Patient states that he has experienced pain in 

his abdomen over the past 1 day with progressively worsening symptoms over the 

same timeframe.  Patient states that his pain is 7/10, constant, worsened with 

movement, relieved somewhat with rest.  Patient acknowledges nausea and multiple

episodes of vomiting over the past 1 day.  Patient states that his last bowel 

movement was approximately 1 hour prior to presentation.  EMS was notified and 

upon arrival the patient was found to be in distress and subsequently cifuentes

sported to [facility] for further care and evaluation.  Patient seen and evaluated in 

the emergency department.  Lab and imaging studies reviewed.  Patient found to 

have a pulse oximetry of 87% on room air which is consistent with acute 

hypoxemic respiratory failure.  The patient was placed on supplemental oxygen 

without significant improvement.  Patient was then placed on noninvasive 

positive pressure ventilation with improvement of pulse oximetry.  Patient 

underwent a CT scan of the abdomen and pelvis which revealed a partial bowel 

obstruction.  Patient also found to have urinary tract infection, as well as 

obesity hypoventilation syndrome.  Patient placed on noninvasive positive 

pressure ventilation in the emergency department.  Surgical team consulted in 

ED.  Cardiology consulted in ED for preoperative cardiac risk assessment.  

Patient admitted to surgical floor.  Patient denies fever, chills, chest pain, 

palpitations, productive cough, bright red blood per rectum, hematemesis, skin 

rash, known ill contacts, known exposure to COVID-19.  No prior admission for 

review.  All medication listed at time of admission have been reconciled.











Past History


Past Medical History: heart failure, hypertension, other (See HPI)


Past Surgical History: hernia repair


Social history: .  denies: smoking, alcohol abuse, prescription drug 

abuse


Family history: diabetes, hypertension





Medications and Allergies


                                    Allergies











Allergy/AdvReac Type Severity Reaction Status Date / Time


 


No Known Allergies Allergy   Verified 07/25/19 06:55











                                Home Medications











 Medication  Instructions  Recorded  Confirmed  Last Taken  Type


 


Amlodipine Besylate [Norvasc] 10 mg PO DAILY 07/26/19 05/04/20 07/25/19 08:00 

History


 


Furosemide [Lasix TAB] 40 mg PO BID 07/26/19 05/04/20 07/24/19 22:00 History


 


hydroCHLOROthiazide [HCTZ] 25 mg PO DAILY 07/26/19 05/04/20 07/25/19 08:00 

History


 


lisinopriL [Zestril TAB] 20 mg PO DAILY 07/26/19 05/04/20 07/25/19 08:00 History


 


traMADoL [Ultram 50 MG tab] 50 mg PO Q6HR PRN #7 tablet 10/26/19 05/04/20 

Unknown Rx














Review of Systems


Constitutional: no weight loss, no weight gain, no fever, no chills


Ears, nose, mouth and throat: no ear pain, no ear discharge, no tinnitis


Cardiovascular: no chest pain, no orthopnea, no palpitations, no rapid/irregular

heart beat


Respiratory: no cough, no cough with sputum, no hemoptysis, no shortness of 

breath


Gastrointestinal: abdominal pain, nausea, vomiting, early satiety, no change in 

bowel habits, no hematemesis, no loss of appetite


Genitourinary Male: no hematuria, no flank pain, no discharge, no urinary 

frequency, no urinary hesitancy


Rectal: no pain, no incontinence, no bleeding


Musculoskeletal: no neck stiffness, no neck pain, no arm numbness/tingling, no 

leg numbness/tingling


Integumentary: no rash, no pruritis, no redness, no sores, no jaundice, no boils


Neurological: no transient paralysis, no paralysis, no weakness, no parathesias,

no numbness, no tingling, no seizures


Psychiatric: no anxiety, no memory loss, no change in sleep habits, no sleep 

disturbances, no insomnia, no hypersomnia, no change in appetite


Endocrine: no cold intolerance, no heat intolerance, no polyphagia, no excessive

thirst, no polydipsia, no polyuria


Hematologic/Lymphatic: no easy bruising, no easy bleeding, no lymphadenopathy, 

no lymphedema


Allergic/Immunologic: no urticaria, no allergic rhinitis, no persistent 

infections, no anaphylaxis, no angioedema





Exam





- Constitutional


Vitals: 


                                        











Temp Pulse Resp BP Pulse Ox


 


 97.6 F   86   20   178/110   96 


 


 05/04/20 14:50  05/04/20 16:20  05/04/20 16:33  05/04/20 16:20  05/04/20 20:06











General appearance: Present: mild distress, obese





- EENT


Eyes: Present: PERRL


ENT: hearing intact, clear oral mucosa





- Neck


Neck: Present: supple, normal ROM





- Respiratory


Respiratory effort: normal


Respiratory: bilateral: CTA





- Cardiovascular


Heart Sounds: Present: S1 & S2.  Absent: rub, click





- Extremities


Extremities: pulses symmetrical, No edema


Peripheral Pulses: within normal limits





- Abdominal


General gastrointestinal: Present: soft, tender, normal bowel sounds, hernia


Male genitourinary: Present: normal





- Integumentary


Integumentary: Present: clear, warm, dry





- Musculoskeletal


Musculoskeletal: generalized weakness





- Psychiatric


Psychiatric: appropriate mood/affect, intact judgment & insight





- Neurologic


Neurologic: CNII-XII intact, moves all extremities





Results





- Labs


CBC & Chem 7: 


                                 05/04/20 15:23





                                 05/04/20 15:23


Labs: 


                              Abnormal lab results











  05/04/20 05/04/20 05/04/20 Range/Units





  15:23 15:23 16:00 


 


WBC  11.4 H    (4.5-11.0)  K/mm3


 


RBC  5.79 H    (3.65-5.03)  M/mm3


 


Hct  47.9 H    (35.5-45.6)  %


 


MCV  83 L    (84-94)  fl


 


MCH  26 L    (28-32)  pg


 


MCHC  31 L    (32-34)  %


 


RDW  16.6 H    (13.2-15.2)  %


 


ABG pO2    64.0 L  (80.0-90.0)  mm Hg


 


ABG HCO3    28.7 H  (20.0-26.0)  mmol/L


 


ABG O2 Saturation    93.5 L  (95.0-99.0)  %


 


ABG Base Excess    3.1 H  (-2.0-3.0)  mmol/L


 


Oxyhemoglobin    90.5 L  (95.0-99.0)  %


 


Glucose   123 H   ()  mg/dL


 


Total Protein   8.4 H   (6.3-8.2)  g/dL


 


Ur Specific Gravity     (1.003-1.030)  


 


Urine WBC (Auto)     (0.0-6.0)  /HPF














  05/04/20 Range/Units





  Unknown 


 


WBC   (4.5-11.0)  K/mm3


 


RBC   (3.65-5.03)  M/mm3


 


Hct   (35.5-45.6)  %


 


MCV   (84-94)  fl


 


MCH   (28-32)  pg


 


MCHC   (32-34)  %


 


RDW   (13.2-15.2)  %


 


ABG pO2   (80.0-90.0)  mm Hg


 


ABG HCO3   (20.0-26.0)  mmol/L


 


ABG O2 Saturation   (95.0-99.0)  %


 


ABG Base Excess   (-2.0-3.0)  mmol/L


 


Oxyhemoglobin   (95.0-99.0)  %


 


Glucose   ()  mg/dL


 


Total Protein   (6.3-8.2)  g/dL


 


Ur Specific Gravity  1.032 H  (1.003-1.030)  


 


Urine WBC (Auto)  113.0 H  (0.0-6.0)  /HPF














Assessment and Plan





- Patient Problems


(1) Acute hypoxemic respiratory failure


Current Visit: Yes   Status: Acute   


Plan to address problem: 


Supplemental oxygen, nebulizer therapy, CTA of the chest, chest x-ray, pulse 

oximetry, noninvasive positive pressure ventilation.








(2) SBO (small bowel obstruction)


Current Visit: Yes   Status: Acute   


Plan to address problem: 


Surgery team consulted, CT abdomen and pelvis, serial abdominal exam, bowel 

rest, supportive care intervention as per surgical team.








(3) Obesity hypoventilation syndrome


Current Visit: Yes   Status: Acute   


Plan to address problem: 


Submental oxygen, noninvasive positive pressure ventilation nightly, supportive 

care.








(4) UTI (urinary tract infection)


Current Visit: Yes   Status: Acute   


Qualifiers: 


   Encounter type: initial encounter 


Plan to address problem: 


CBC, urinalysis, IV antibiotic therapy.








(5) Morbid obesity with BMI of 50.0-59.9, adult


Current Visit: Yes   Status: Acute   


Plan to address problem: 


Balanced diet, increase physical activity at discharge, outpatient bariatric 

surgery follow-up.








(6) DVT prophylaxis


Current Visit: Yes   Status: Acute   


Plan to address problem: 


SCD to bilateral lower extremities while in bed, prophylactic heparin

## 2020-05-04 NOTE — CAT SCAN REPORT
CTA chest with contrast

CT abdomen and pelvis with contrast



INDICATION : Hypoxia.  Acute generalized abdominal pain and vomiting



TECHNIQUE:  Axial imaging performed through the chest, with contrast bolus timing set to maximize opa
cification of the pulmonary arteries. 3-plane MIP reformatted images were obtained. Axial imaging was
 also performed through the abdomen and pelvis. All CT scans at this location are performed using CT 
dose reduction for ALARA by means of automated exposure control. 



100 mL of intravenous contrast administered.



COMPARISON:  CT abdomen from 10/26/2019



FINDINGS:



CHEST:



Bolus:  Contrast bolus timing is adequate; however, there is severe respiratory motion artifact in th
e lung bases which essentially yields this portion of the exam nondiagnostic.

PTE:  No filling defect is present to suggest PTE in the mid to upper lung zones. Again, the lung bas
es are not adequately evaluated on this exam.

Mediastinum:  Heart and great vessels appear normal.  No pathologic mediastinal adenopathy.

Lungs:  Significant motion artifact is present in the lung bases, with no gross acute abnormality rama
ntified. Moderate emphysematous changes are present.

Bones:  Degenerative changes in the spine with nothing acute.



ABDOMEN/PELVIS:



The liver, spleen, pancreas, and right kidney appear unremarkable. There is mild bilateral adrenal th
ickening. Simple cysts are again seen within the kidneys.



There is an irregular ventral wall hernia containing multiple loops of bowel. The small bowel leading
 up to this area is abnormally dilated and fluid-filled consistent with obstruction. There is mild in
flammation involving a portion of the bowel in the midline hernia.



No significant pelvic free fluid. Bladder is mostly collapsed. Prostate is normal in size. No acute c
olonic abnormality identified. The appendix is normal.



IMPRESSION: 

1. Negative for PTE in the mid to upper lung zones. The lung bases are not adequately evaluated becau
se of severe motion as described.

2. Recurrent small bowel obstruction tracking to an irregular ventral wall hernia.



Signer Name: Volodymyr Goss MD 

Signed: 5/4/2020 7:00 PM

Workstation Name: [id number]

## 2020-05-04 NOTE — XRAY REPORT
CHEST 1 VIEW 5/4/2020 3:33 PM



INDICATION / CLINICAL INFORMATION:

Shortness of breath.



COMPARISON: 

2 views of the chest from 7/25/2019.



FINDINGS:



SUPPORT DEVICES: None.

HEART / MEDIASTINUM: No significant abnormality. 

LUNGS / PLEURA: Low lung volumes are seen with probable hypoventilatory changes. The upper lungs are 
clear. No significant pleural effusion. No pneumothorax. 



ADDITIONAL FINDINGS: No significant additional findings.



IMPRESSION:

1. No acute abnormality of the chest. 

2. Additional findings as above.



Signer Name: Tremayne Rashid MD 

Signed: 5/4/2020 4:35 PM

Workstation Name: [id number]

## 2020-05-04 NOTE — EMERGENCY DEPARTMENT REPORT
HPI





- General


Chief Complaint: Abdominal Pain


Time Seen by Provider: 20 15:11





- HPI


HPI: 





Room 23








The patient is a 55-year-old male present with a chief complaint of abdominal 

pain.  The patient states he came to the emergency department because of his 

"hernia."  Patient states he has pain on both sides of his abdomen associated 

with nausea vomiting since yesterday.  Patient states his last bowel movement 

occurred 30 to 40 minutes ago and was watery in nature.  Patient denies history 

of fever, cough rhinorrhea or known sick contacts.  Patient admits to chronic 

shortness of breath because he is "out of shape."

















ED Past Medical Hx





- Past Medical History


Previous Medical History?: Yes


Hx Hypertension: Yes


Hx Congestive Heart Failure: Yes


Additional medical history: Sleep apnea





- Surgical History


Past Surgical History?: Yes


Hx Cholecystectomy: Yes


Additional Surgical History: hernia sx





- Family History


Family history: no significant





- Social History


Smoking Status: Former Smoker (None x4 years)


Substance Use Type: None (Denies illicit drug use)





- Medications


Home Medications: 


                                Home Medications











 Medication  Instructions  Recorded  Confirmed  Last Taken  Type


 


Amlodipine Besylate [Norvasc] 10 mg PO DAILY 19 08:00 

History


 


Furosemide [Lasix TAB] 40 mg PO BID 19 22:00 History


 


hydroCHLOROthiazide [HCTZ] 25 mg PO DAILY 19 08:00 

History


 


lisinopriL [Zestril TAB] 20 mg PO DAILY 19 08:00 History


 


traMADoL [Ultram 50 MG tab] 50 mg PO Q6HR PRN #7 tablet 10/26/19 05/04/20 

Unknown Rx














ED Review of Systems


ROS: 


Stated complaint: NAUSEA VOMITING


Other details as noted in HPI





Constitutional: denies: fever


Eyes: denies: eye pain


ENT: denies: throat pain


Respiratory: shortness of breath.  denies: cough


Cardiovascular: denies: chest pain


Endocrine: no symptoms reported


Gastrointestinal: abdominal pain, nausea, vomiting, diarrhea


Genitourinary: denies: dysuria


Musculoskeletal: denies: back pain


Neurological: denies: headache





Physical Exam





- Physical Exam


Vital Signs: 


                                   Vital Signs











  20





  14:50


 


Temperature 97.6 F


 


Pulse Rate 89


 


Respiratory 22





Rate 


 


Blood Pressure 150/90


 


O2 Sat by Pulse 91





Oximetry 











Physical Exam: 





GENERAL: The patient is well-developed well-nourished male sitting on stretcher 

not appearing to be in acute distress.  Patient holding empty emesis bag


HEENT: Normocephalic.  Atraumatic.  Extraocular motions are intact.  Patient has

moist mucous membranes.


NECK: Supple.  Trachea midline


CHEST/LUNGS: Clear to auscultation.  There is no respiratory distress noted.


HEART/CARDIOVASCULAR: Regular.  There is no tachycardia.  There is no gallop rub

or murmur.


ABDOMEN: Abdomen is soft, with diffuse discomfort to palpation.  Patient has 

normal bowel sounds.  There is no abdominal distention.


SKIN: There is no rash.  There is no edema.  There is no diaphoresis.


NEURO: The patient is awake, alert, and oriented.  The patient is cooperative.  

The patient has normal speech


MUSCULOSKELETAL: There is no evidence of acute injury.





ED Course


                                   Vital Signs











  20





  14:50


 


Temperature 97.6 F


 


Pulse Rate 89


 


Respiratory 22





Rate 


 


Blood Pressure 150/90


 


O2 Sat by Pulse 91





Oximetry 














- Consultations


Consultation #1: 





20 19:10


Surgery paged


20 19:23


Case discussed with Dr. Soto-request cardiology brought on board to assess 

patient's preoperative candidacy.


Consultation #2: 





20 19:44


Case discussed with cardiologist Dr. Hicks





ED Medical Decision Making





- Lab Data


Result diagrams: 


                                 20 15:23





                                 20 15:23





                                Laboratory Tests











  20





  15:23 15:23 16:00


 


WBC  11.4 H  


 


RBC  5.79 H  


 


Hgb  15.0  


 


Hct  47.9 H  


 


MCV  83 L  


 


MCH  26 L  


 


MCHC  31 L  


 


RDW  16.6 H  


 


Plt Count  391  


 


ABG pH    7.401


 


ABG pCO2    47.3


 


ABG pO2    64.0 L


 


ABG HCO3    28.7 H


 


ABG O2 Saturation    93.5 L


 


ABG O2 Content    19.2


 


ABG Base Excess    3.1 H


 


ABG Hemoglobin    15.1


 


ABG Carboxyhemoglobin    2.5


 


ABG Methemoglobin    0.7


 


Oxyhemoglobin    90.5 L


 


FiO2    21


 


Sodium   142 


 


Potassium   4.1 


 


Chloride   99.9 


 


Carbon Dioxide   30 


 


Anion Gap   16 


 


BUN   11 


 


Creatinine   1.1 


 


Estimated GFR   > 60 


 


BUN/Creatinine Ratio   10 


 


Glucose   123 H 


 


Calcium   9.9 


 


Total Bilirubin   0.50 


 


AST   18 


 


ALT   20 


 


Alkaline Phosphatase   91 


 


Troponin T   < 0.010 


 


NT-Pro-B Natriuret Pep   66.65 


 


Total Protein   8.4 H 


 


Albumin   4.4 


 


Albumin/Globulin Ratio   1.1 


 


Lipase   19 


 


Urine Color   


 


Urine Turbidity   


 


Urine pH   


 


Ur Specific Gravity   


 


Urine Protein   


 


Urine Glucose (UA)   


 


Urine Ketones   


 


Urine Blood   


 


Urine Nitrite   


 


Urine Bilirubin   


 


Urine Urobilinogen   


 


Ur Leukocyte Esterase   


 


Urine WBC (Auto)   


 


Urine RBC (Auto)   


 


Urine Bacteria (Auto)   


 


Urine Mucus   


 


Urine Yeast (Budding)   














  20





  Unknown


 


WBC 


 


RBC 


 


Hgb 


 


Hct 


 


MCV 


 


MCH 


 


MCHC 


 


RDW 


 


Plt Count 


 


ABG pH 


 


ABG pCO2 


 


ABG pO2 


 


ABG HCO3 


 


ABG O2 Saturation 


 


ABG O2 Content 


 


ABG Base Excess 


 


ABG Hemoglobin 


 


ABG Carboxyhemoglobin 


 


ABG Methemoglobin 


 


Oxyhemoglobin 


 


FiO2 


 


Sodium 


 


Potassium 


 


Chloride 


 


Carbon Dioxide 


 


Anion Gap 


 


BUN 


 


Creatinine 


 


Estimated GFR 


 


BUN/Creatinine Ratio 


 


Glucose 


 


Calcium 


 


Total Bilirubin 


 


AST 


 


ALT 


 


Alkaline Phosphatase 


 


Troponin T 


 


NT-Pro-B Natriuret Pep 


 


Total Protein 


 


Albumin 


 


Albumin/Globulin Ratio 


 


Lipase 


 


Urine Color  Yellow


 


Urine Turbidity  Turbid


 


Urine pH  5.0


 


Ur Specific Gravity  1.032 H


 


Urine Protein  100 mg/dl


 


Urine Glucose (UA)  Neg


 


Urine Ketones  Neg


 


Urine Blood  Sm


 


Urine Nitrite  Neg


 


Urine Bilirubin  Neg


 


Urine Urobilinogen  < 2.0


 


Ur Leukocyte Esterase  Neg


 


Urine WBC (Auto)  113.0 H


 


Urine RBC (Auto)  21.0


 


Urine Bacteria (Auto)  4+


 


Urine Mucus  3+


 


Urine Yeast (Budding)  3+














- EKG Data


-: EKG Interpreted by Me


EKG shows normal: sinus rhythm


Rate: normal





- EKG Data


When compared to previous EKG there are: previous EKG unavailable


Interpretation: other (No ischemic changes seen)





- Radiology Data


Radiology results: report reviewed (CT chest, CT abdomen pelvis), image reviewed

(CT chest, CT abdomen pelvis)





Findings


[facility] [address] [place] [address] Cat

Scan Report Signed Patient: KATELYN WEAVER MR#: [id number] : 1965

Acct:[id number] Age/Sex: 55 / M ADM Date: 20 Loc: ED Attending Dr: 

Ordering Physician: SAILAJA GANN MD Date of Service: 20 Procedure(s): CT 

angio chest Accession Number(s): [id number] cc: SAILAJA GANN MD CTA chest with c

ontrast CT abdomen and pelvis with contrast INDICATION : Hypoxia. Acute 

generalized abdominal pain and vomiting TECHNIQUE: Axial imaging performed 

through the chest, with contrast bolus timing set to maximize opacification of 

the pulmonary arteries. 3-plane MIP reformatted images were obtained. Axial 

imaging was also performed through the abdomen and pelvis. All CT scans at this 

location are performed using CT dose reduction for ALARA by means of automated 

exposure control. 100 mL of intravenous contrast administered. COMPARISON: CT 

abdomen from 10/26/2019 FINDINGS: CHEST: Bolus: Contrast bolus timing is 

adequate; however, there is severe respiratory motion artifact in the lung bases

which essentially yields this portion of the exam nondiagnostic. PTE: No filling

defect is present to suggest PTE in the mid to upper lung zones. Again, the lung

bases are not adequately evaluated on this exam. Mediastinum: Heart and great 

vessels appear normal. No pathologic mediastinal adenopathy. Lungs: Significant 

motion artifact is present in the lung bases, with no gross acute abnormality 

identified. Moderate emphysematous changes are present. Bones: Degenerative 

changes in the spine with nothing acute. ABDOMEN/PELVIS: The liver, spleen, 

pancreas, and right kidney appear unremarkable. There is mild bilateral adrenal 

thickening. Simple cysts are again seen within the kidneys. There is an irreg

ular ventral wall hernia containing multiple loops of bowel. The small bowel 

leading up to this area is abnormally dilated and fluid-filled consistent with 

obstruction. There is mild inflammation involving a portion of the bowel in the 

midline hernia. No significant pelvic free fluid. Bladder is mostly collapsed. 

Prostate is normal in size. No acute colonic abnormality identified. The 

appendix is normal. IMPRESSION: 1. Negative for PTE in the mid to upper lung 

zones. The lung bases are not adequately evaluated because of severe motion as 

described. 2. Recurrent small bowel obstruction tracking to an irregular ventral

wall hernia. Signer Name: Volodymyr Goss MD Signed: 2020 7:00 PM Works

tation Name: [id number] Transcribed By: EDILBERTO Dictated By: Volodymyr Goss MD 

Electronically Authenticated By: Volodymyr Goss MD Signed Date/Time: 

20 190 DD/DT: 20 TD/TT: 














- Differential Diagnosis


Partial small bowel obstruction, gastroenteritis,


Critical care attestation.: 


If time is entered above; I have spent that time in minutes in the direct care 

of this critically ill patient, excluding procedure time.








ED Disposition


Clinical Impression: 


 SBO (small bowel obstruction), Ventral hernia, Hypoxia





Disposition:  OP ADMIT IP TO THIS HOSP


Is pt being admited?: Yes


Does the pt Need Aspirin: No


Condition: Fair


Referrals: 


PRIMARY CARE,MD [Primary Care Provider] - 3-5 Days


Time of Disposition: 19:44 (Hospitalist Paged (Dr Rowland))

## 2020-05-05 VITALS — DIASTOLIC BLOOD PRESSURE: 89 MMHG | SYSTOLIC BLOOD PRESSURE: 152 MMHG

## 2020-05-05 LAB
BASOPHILS # (AUTO): 0.1 K/MM3 (ref 0–0.1)
BASOPHILS NFR BLD AUTO: 0.7 % (ref 0–1.8)
BUN SERPL-MCNC: 11 MG/DL (ref 9–20)
BUN/CREAT SERPL: 11 %
CALCIUM SERPL-MCNC: 9 MG/DL (ref 8.4–10.2)
EOSINOPHIL # BLD AUTO: 0.1 K/MM3 (ref 0–0.4)
EOSINOPHIL NFR BLD AUTO: 1.4 % (ref 0–4.3)
HCT VFR BLD CALC: 42.5 % (ref 35.5–45.6)
HEMOLYSIS INDEX: 41
HGB BLD-MCNC: 13.8 GM/DL (ref 11.8–15.2)
LYMPHOCYTES # BLD AUTO: 2.2 K/MM3 (ref 1.2–5.4)
LYMPHOCYTES NFR BLD AUTO: 24 % (ref 13.4–35)
MCHC RBC AUTO-ENTMCNC: 33 % (ref 32–34)
MCV RBC AUTO: 83 FL (ref 84–94)
MONOCYTES # (AUTO): 1.1 K/MM3 (ref 0–0.8)
MONOCYTES % (AUTO): 11.5 % (ref 0–7.3)
PLATELET # BLD: 318 K/MM3 (ref 140–440)
RBC # BLD AUTO: 5.1 M/MM3 (ref 3.65–5.03)

## 2020-05-05 RX ADMIN — Medication SCH ML: at 11:01

## 2020-05-05 RX ADMIN — HEPARIN SODIUM SCH UNIT: 5000 INJECTION, SOLUTION INTRAVENOUS; SUBCUTANEOUS at 11:01

## 2020-05-05 NOTE — CONSULTATION
History of Present Illness


Consult date: 05/05/20


Consult reason: pre op evaluation


History of present illness: 





This is a 55-year old male whom is morbidly obese and has a history of sleep 

apnea, noncompliant with his cpap machine. Patient presented with nausea, 

vomiting, abdominal pain, found to have ventral hernia on abdominal CT scan. A 

cardiac consultation has been requested for pre-operative risk assessment if 

surgery is planned. 





Patient denies chest pain, palpitations and unusual shortness of breath. There 

is no prior cardiac history and he had no recent cardiac workup. An ECG is 

benign, sinus rhythm. No acute ischemic changes.





Past History


Past Surgical History: hernia repair


Social history: , other (Small business owner).  denies: smoking, 

alcohol abuse, prescription drug abuse


Family history: diabetes, hypertension





Medications and Allergies


                                    Allergies











Allergy/AdvReac Type Severity Reaction Status Date / Time


 


No Known Allergies Allergy   Verified 07/25/19 06:55











                                Home Medications











 Medication  Instructions  Recorded  Confirmed  Last Taken  Type


 


Amlodipine Besylate [Norvasc] 10 mg PO DAILY 07/26/19 05/04/20 07/25/19 08:00 

History


 


Furosemide [Lasix TAB] 40 mg PO BID 07/26/19 05/04/20 07/24/19 22:00 History


 


hydroCHLOROthiazide [HCTZ] 25 mg PO DAILY 07/26/19 05/04/20 07/25/19 08:00 

History


 


lisinopriL [Zestril TAB] 20 mg PO DAILY 07/26/19 05/04/20 07/25/19 08:00 History


 


traMADoL [Ultram 50 MG tab] 50 mg PO Q6HR PRN #7 tablet 10/26/19 05/04/20 

Unknown Rx











Active Meds: 


Active Medications





Acetaminophen (Tylenol)  650 mg PO Q4H PRN


   PRN Reason: Pain MILD(1-3)/Fever >100.5/HA


Amlodipine Besylate (Amlodipine)  10 mg PO DAILY [facility]


   Last Admin: 05/05/20 11:00 Dose:  10 mg


   Documented by: 


Furosemide (Lasix)  40 mg IV 0600,1800 [facility]


   Last Admin: 05/05/20 05:45 Dose:  40 mg


   Documented by: 


Heparin Sodium (Porcine) (Heparin)  5,000 unit SUB-Q Q12HR [facility]


   Last Admin: 05/05/20 11:01 Dose:  5,000 unit


   Documented by: 


Hydrochlorothiazide (Hctz)  25 mg PO DAILY [facility]


   Last Admin: 05/05/20 11:00 Dose:  25 mg


   Documented by: 


Ceftriaxone Sodium (Rocephin/Ns 1 Gm/50 Ml)  1 gm in 50 mls @ 100 mls/hr IV 

Q24HR [facility]; Protocol


   Last Admin: 05/05/20 11:01 Dose:  100 mls/hr


   Documented by: 


Lisinopril (Zestril)  20 mg PO DAILY [facility]


   Last Admin: 05/05/20 11:01 Dose:  20 mg


   Documented by: 


Ondansetron HCl (Zofran)  4 mg IV Q8H PRN


   PRN Reason: Nausea And Vomiting


Oxycodone/Acetaminophen (Percocet 5/325)  1 tab PO Q6H PRN


   PRN Reason: Pain, Moderate (4-6)


   Last Admin: 05/05/20 05:45 Dose:  1 tab


   Documented by: 


Sodium Chloride (Sodium Chloride Flush Syringe 10 Ml)  10 ml IV BID [facility]


   Last Admin: 05/05/20 11:01 Dose:  10 ml


   Documented by: 


Sodium Chloride (Sodium Chloride Flush Syringe 10 Ml)  10 ml IV PRN PRN


   PRN Reason: LINE FLUSH


Tramadol HCl (Ultram)  50 mg PO Q6H PRN


   PRN Reason: PAIN BREAKTHROUGH











Physical Examination


                                   Vital Signs











Temp Pulse Resp BP Pulse Ox


 


 97.6 F   89   22   150/90   91 


 


 05/04/20 14:50  05/04/20 14:50  05/04/20 14:50  05/04/20 14:50  05/04/20 14:50











General appearance: no acute distress


HEENT: Positive: PERRL


Neck: Positive: trachea midline


Cardiac: Positive: Reg Rate and Rhythm





Results





                                 05/05/20 04:49





                                 05/05/20 04:49


                                 Cardiac Enzymes











  05/04/20 Range/Units





  15:23 


 


AST  18  (5-40)  units/L








                                       CBC











  05/04/20 05/05/20 Range/Units





  15:23 04:49 


 


WBC  11.4 H  9.2  (4.5-11.0)  K/mm3


 


RBC  5.79 H  5.10 H  (3.65-5.03)  M/mm3


 


Hgb  15.0  13.8  (11.8-15.2)  gm/dl


 


Hct  47.9 H  42.5  (35.5-45.6)  %


 


Plt Count  391  318  (140-440)  K/mm3


 


Lymph #   2.2  (1.2-5.4)  K/mm3


 


Mono #   1.1 H  (0.0-0.8)  K/mm3


 


Eos #   0.1  (0.0-0.4)  K/mm3


 


Baso #   0.1  (0.0-0.1)  K/mm3








                          Comprehensive Metabolic Panel











  05/04/20 05/05/20 Range/Units





  15:23 04:49 


 


Sodium  142  142  (137-145)  mmol/L


 


Potassium  4.1  3.7  (3.6-5.0)  mmol/L


 


Chloride  99.9  100.3  ()  mmol/L


 


Carbon Dioxide  30  32 H  (22-30)  mmol/L


 


BUN  11  11  (9-20)  mg/dL


 


Creatinine  1.1  1.0  (0.8-1.5)  mg/dL


 


Glucose  123 H  99  ()  mg/dL


 


Calcium  9.9  9.0  (8.4-10.2)  mg/dL


 


AST  18   (5-40)  units/L


 


ALT  20   (7-56)  units/L


 


Alkaline Phosphatase  91   ()  units/L


 


Total Protein  8.4 H   (6.3-8.2)  g/dL


 


Albumin  4.4   (3.9-5)  g/dL














Assessment and Plan





- Patient Problems


(1) Ventral hernia


Current Visit: Yes   Status: Acute

## 2020-05-05 NOTE — CONSULTATION
History of Present Illness


Consult date: 05/05/20


Reason for consult: abdominal pain


Requesting physician: SAILAJA GANN


Chief complaint: 





abdominal pain





- History of present illness


History of present illness: 





56yo M with multiple medical problems presents with acute onset of abdominal 

pain to the emergency department.  Patient was found to have a symptomatic 

ventral hernia based on CT scan.  General surgery was asked to evaluate and 

manage.





Patient has had a long history of abdominal wall hernias.  In 2014 he had his 

first abdominal wall hernia repair at [facility].  In 2015, the hernia 

recurred.  Plans were being made for repair of the recurrence but in 2017 he had

a "rupture" of that hernia.  This required emergency surgery for which she had 

an exploratory laparotomy.  The hernia came back in 2018.  He was scheduled for 

another repair in January 2020.  On the day of surgery, he decided not to go 

forward with surgery after the anesthesiologist told him that they may not be 

able to "wake him up".  That scared him and he canceled the surgery.





Yesterday, he had a sudden onset of abdominal pain associated with nausea and 

vomiting.  This has happened before and if he stops eating and allows his body 

to rest, it usually goes away.  He has had NG tube before and they generally 

fall out.  He refused the NG tube last night.  He reports that his pain has 

subsided.  Denies any nausea or vomiting.  Would like to try eating.  He has 

been passing flatus during the night.





Past History


Past Medical History: heart failure, hypertension, other (See HPI)


Past Surgical History: hernia repair


Social history: , other (Small business owner).  denies: smoking, 

alcohol abuse, prescription drug abuse


Family history: diabetes, hypertension





Medications and Allergies


                                    Allergies











Allergy/AdvReac Type Severity Reaction Status Date / Time


 


No Known Allergies Allergy   Verified 07/25/19 06:55











                                Home Medications











 Medication  Instructions  Recorded  Confirmed  Last Taken  Type


 


Amlodipine Besylate [Norvasc] 10 mg PO DAILY 07/26/19 05/04/20 07/25/19 08:00 

History


 


Furosemide [Lasix TAB] 40 mg PO BID 07/26/19 05/04/20 07/24/19 22:00 History


 


hydroCHLOROthiazide [HCTZ] 25 mg PO DAILY 07/26/19 05/04/20 07/25/19 08:00 Hist

ory


 


lisinopriL [Zestril TAB] 20 mg PO DAILY 07/26/19 05/04/20 07/25/19 08:00 History


 


traMADoL [Ultram 50 MG tab] 50 mg PO Q6HR PRN #7 tablet 10/26/19 05/04/20 

Unknown Rx











Active Meds: 


Active Medications





Acetaminophen (Tylenol)  650 mg PO Q4H PRN


   PRN Reason: Pain MILD(1-3)/Fever >100.5/HA


Amlodipine Besylate (Amlodipine)  10 mg PO DAILY [facility]


Furosemide (Lasix)  40 mg IV 0600,1800 [facility]


   Last Admin: 05/05/20 05:45 Dose:  40 mg


   Documented by: 


Heparin Sodium (Porcine) (Heparin)  5,000 unit SUB-Q Q12HR [facility]


   Last Admin: 05/04/20 22:26 Dose:  5,000 unit


   Documented by: 


Hydrochlorothiazide (Hctz)  25 mg PO DAILY [facility]


Ceftriaxone Sodium (Rocephin/Ns 1 Gm/50 Ml)  1 gm in 50 mls @ 100 mls/hr IV 

Q24HR [facility]; Protocol


Lisinopril (Zestril)  20 mg PO DAILY [facility]


Ondansetron HCl (Zofran)  4 mg IV Q8H PRN


   PRN Reason: Nausea And Vomiting


Oxycodone/Acetaminophen (Percocet 5/325)  1 tab PO Q6H PRN


   PRN Reason: Pain, Moderate (4-6)


   Last Admin: 05/05/20 05:45 Dose:  1 tab


   Documented by: 


Sodium Chloride (Sodium Chloride Flush Syringe 10 Ml)  10 ml IV BID [facility]


   Last Admin: 05/04/20 22:26 Dose:  10 ml


   Documented by: 


Sodium Chloride (Sodium Chloride Flush Syringe 10 Ml)  10 ml IV PRN PRN


   PRN Reason: LINE FLUSH


Tramadol HCl (Ultram)  50 mg PO Q6H PRN


   PRN Reason: PAIN BREAKTHROUGH











Review of Systems





- Constitutional


fatigue, no fever, no chills, no chronic pain





- Cardiovascular


dyspnea on exertion, no chest pain





- Respiratory


sleep apnea, no cough





- Gastrointestinal


abdominal pain, nausea, vomiting





- Genitourinary


no dysuria





- Muskuloskeletal


no low back pain





- Integumentary


no rash, no pruritis, no wounds





Exam


                                   Vital Signs











Temp Pulse Resp BP Pulse Ox


 


 97.6 F   89   22   150/90   91 


 


 05/04/20 14:50  05/04/20 14:50  05/04/20 14:50  05/04/20 14:50  05/04/20 14:50














- General physical appearance


Positive: no distress, no pain, obese (Morbid), other (Pleasant)





- Eyes


Positive: normal occular movement





- Respiratory


Positive: normal expansion, normal respiratory effort, clear to auscultation





- Cardiovascular


Rhythm: regular





- Abdomen


Abdomen: Present: soft, bowel sounds normal, surgical scars (Multiple well-

healed scars.).  Absent: tender, guarding, rigid


Hernia: incisional (Patient has multiple areas of swelling on the abdominal 

wall.  The main area near the umbilicus is reducible.  He has no tenderness at 

any of the areas of swelling.), reducible





- Integumentary


no rash, no growths, no abnormal pigmentation





- Neurologic


Neurologic: alert and oriented to time, place and person, motor strength and 

sensation are grossly intact





- Psychiatric


Psychiatric: appropriate mood/affect, intact judgment & insight, cooperative





Results





- Labs





                                 05/05/20 04:49





                                 05/05/20 04:49


                              Abnormal lab results











  05/04/20 05/04/20 05/04/20 Range/Units





  15:23 15:23 16:00 


 


WBC  11.4 H    (4.5-11.0)  K/mm3


 


RBC  5.79 H    (3.65-5.03)  M/mm3


 


Hct  47.9 H    (35.5-45.6)  %


 


MCV  83 L    (84-94)  fl


 


MCH  26 L    (28-32)  pg


 


MCHC  31 L    (32-34)  %


 


RDW  16.6 H    (13.2-15.2)  %


 


Mono % (Auto)     (0.0-7.3)  %


 


Mono #     (0.0-0.8)  K/mm3


 


ABG pO2    64.0 L  (80.0-90.0)  mm Hg


 


ABG HCO3    28.7 H  (20.0-26.0)  mmol/L


 


ABG O2 Saturation    93.5 L  (95.0-99.0)  %


 


ABG Base Excess    3.1 H  (-2.0-3.0)  mmol/L


 


Oxyhemoglobin    90.5 L  (95.0-99.0)  %


 


Carbon Dioxide     (22-30)  mmol/L


 


Glucose   123 H   ()  mg/dL


 


Total Protein   8.4 H   (6.3-8.2)  g/dL


 


Ur Specific Gravity     (1.003-1.030)  


 


Urine WBC (Auto)     (0.0-6.0)  /HPF














  05/04/20 05/05/20 05/05/20 Range/Units





  Unknown 04:49 04:49 


 


WBC     (4.5-11.0)  K/mm3


 


RBC   5.10 H   (3.65-5.03)  M/mm3


 


Hct     (35.5-45.6)  %


 


MCV   83 L   (84-94)  fl


 


MCH   27 L   (28-32)  pg


 


MCHC     (32-34)  %


 


RDW   16.7 H   (13.2-15.2)  %


 


Mono % (Auto)   11.5 H   (0.0-7.3)  %


 


Mono #   1.1 H   (0.0-0.8)  K/mm3


 


ABG pO2     (80.0-90.0)  mm Hg


 


ABG HCO3     (20.0-26.0)  mmol/L


 


ABG O2 Saturation     (95.0-99.0)  %


 


ABG Base Excess     (-2.0-3.0)  mmol/L


 


Oxyhemoglobin     (95.0-99.0)  %


 


Carbon Dioxide    32 H  (22-30)  mmol/L


 


Glucose     ()  mg/dL


 


Total Protein     (6.3-8.2)  g/dL


 


Ur Specific Gravity  1.032 H    (1.003-1.030)  


 


Urine WBC (Auto)  113.0 H    (0.0-6.0)  /HPF








                                 Diabetes panel











  05/04/20 05/05/20 Range/Units





  15:23 04:49 


 


Sodium  142  142  (137-145)  mmol/L


 


Potassium  4.1  3.7  (3.6-5.0)  mmol/L


 


Chloride  99.9  100.3  ()  mmol/L


 


Carbon Dioxide  30  32 H  (22-30)  mmol/L


 


BUN  11  11  (9-20)  mg/dL


 


Creatinine  1.1  1.0  (0.8-1.5)  mg/dL


 


Glucose  123 H  99  ()  mg/dL


 


Calcium  9.9  9.0  (8.4-10.2)  mg/dL


 


AST  18   (5-40)  units/L


 


ALT  20   (7-56)  units/L


 


Alkaline Phosphatase  91   ()  units/L


 


Total Protein  8.4 H   (6.3-8.2)  g/dL


 


Albumin  4.4   (3.9-5)  g/dL








                                  Calcium panel











  05/04/20 05/05/20 Range/Units





  15:23 04:49 


 


Calcium  9.9  9.0  (8.4-10.2)  mg/dL


 


Albumin  4.4   (3.9-5)  g/dL








                                 Pituitary panel











  05/04/20 05/05/20 Range/Units





  15:23 04:49 


 


Sodium  142  142  (137-145)  mmol/L


 


Potassium  4.1  3.7  (3.6-5.0)  mmol/L


 


Chloride  99.9  100.3  ()  mmol/L


 


Carbon Dioxide  30  32 H  (22-30)  mmol/L


 


BUN  11  11  (9-20)  mg/dL


 


Creatinine  1.1  1.0  (0.8-1.5)  mg/dL


 


Glucose  123 H  99  ()  mg/dL


 


Calcium  9.9  9.0  (8.4-10.2)  mg/dL








                                  Adrenal panel











  05/04/20 05/05/20 Range/Units





  15:23 04:49 


 


Sodium  142  142  (137-145)  mmol/L


 


Potassium  4.1  3.7  (3.6-5.0)  mmol/L


 


Chloride  99.9  100.3  ()  mmol/L


 


Carbon Dioxide  30  32 H  (22-30)  mmol/L


 


BUN  11  11  (9-20)  mg/dL


 


Creatinine  1.1  1.0  (0.8-1.5)  mg/dL


 


Glucose  123 H  99  ()  mg/dL


 


Calcium  9.9  9.0  (8.4-10.2)  mg/dL


 


Total Bilirubin  0.50   (0.1-1.2)  mg/dL


 


AST  18   (5-40)  units/L


 


ALT  20   (7-56)  units/L


 


Alkaline Phosphatase  91   ()  units/L


 


Total Protein  8.4 H   (6.3-8.2)  g/dL


 


Albumin  4.4   (3.9-5)  g/dL














- Imaging


CT scan - abdomen: report reviewed, image reviewed


CT scan - pelvis: report reviewed, image reviewed





Assessment and Plan





- Patient Problems


(1) Incisional hernia without mention of obstruction or gangrene


Current Visit: Yes   Status: Acute   


Plan to address problem: 


Patient stable.  Patient with an incisional abdominal wall hernia with multiple 

recurrences.  Appears as though the small bowel has spontaneously reduced enough

such that his symptoms have resolved.  He feels back to normal.  This is what 

has occurred in the past for him.  He would like to try diet today.  There is no

urgent indication for surgery at this moment.





Patient appears to be a moderate to high risk surgical candidate.  As we do not 

have an immediate need for surgery, we discussed that it would be best for him 

to return to [facility] for the surgery that he was originally scheduled to 

have in January of this year.  In the meantime, we discussed the importance of 

weight loss and exercise to get him in better shape for the surgery.  He was in 

agreement with that plan.





We will start the patient on a clear liquid diet.  If he tolerates liquid 

intake, he is okay for discharge from my standpoint with follow-up at [facility].





Please call with questions.





Time=30min

## 2020-05-05 NOTE — DISCHARGE SUMMARY
Providers





- Providers


Date of Admission: 


05/04/20 20:29





Date of discharge: 05/05/20


Attending physician: 


HORTENCIA HELLER





                                        





05/04/20 19:22


Consult to Physician [CONS] Urgent 


   Comment: Dr. Sparks spoke with Dr. Soto @ [phone]


   Consulting Provider: EVANS SOTO


   Physician Instructions: 


   Reason For Exam: Small bowel obstruction, ventral hernia





05/04/20 19:43


Consult to Physician [CONS] Urgent 


   Comment: Dr. Sparks spoke with Dr. Hicks @ [phone]


   Consulting Provider: NICOLASA HICKS


   Physician Instructions: 


   Reason For Exam: Preop evaluation











Primary care physician: 


PRIMARY CARE MD








Hospitalization


Condition: Fair


Disposition: DC-01 TO HOME OR SELFCARE





Core Measure Documentation





- Palliative Care


Palliative Care/ Comfort Measures: Not Applicable





- Core Measures


Any of the following diagnoses?: none





Exam





- Constitutional


Vitals: 


                                        











Temp Pulse Resp BP Pulse Ox


 


 97.7 F   81   20   152/89   92 


 


 05/05/20 11:47  05/05/20 11:47  05/05/20 11:47  05/05/20 11:47  05/05/20 11:52














Plan


Activity: no restrictions


Diet: other (Clear liquid diet)


Additional Instructions: 1.Follow up with PCP in 2-3 days.  2.Follow up with 

Surgeon at [facility] in 2-3 days


Plan of Treatment: 


1.Follow up with PCP in 2-3 days.


2.Follow up with Surgeon at [facility] in 2-3 days


Follow up with: 


PRIMARY CARE,MD [Primary Care Provider] - 3-5 Days